# Patient Record
Sex: FEMALE | Race: WHITE | Employment: OTHER | URBAN - METROPOLITAN AREA
[De-identification: names, ages, dates, MRNs, and addresses within clinical notes are randomized per-mention and may not be internally consistent; named-entity substitution may affect disease eponyms.]

---

## 2021-08-05 ENCOUNTER — HOSPITAL ENCOUNTER (INPATIENT)
Age: 26
LOS: 2 days | Discharge: HOME | DRG: 392 | End: 2021-08-09
Attending: INTERNAL MEDICINE | Admitting: HOSPITALIST
Payer: OTHER GOVERNMENT

## 2021-08-05 ENCOUNTER — APPOINTMENT (OUTPATIENT)
Dept: GENERAL RADIOLOGY | Age: 26
DRG: 392 | End: 2021-08-05
Attending: PHYSICIAN ASSISTANT
Payer: OTHER GOVERNMENT

## 2021-08-05 DIAGNOSIS — E86.0 DEHYDRATION: ICD-10-CM

## 2021-08-05 DIAGNOSIS — R11.2 INTRACTABLE NAUSEA AND VOMITING: ICD-10-CM

## 2021-08-05 DIAGNOSIS — K52.9 GASTROENTERITIS: Primary | ICD-10-CM

## 2021-08-05 PROBLEM — E87.6 HYPOKALEMIA: Status: ACTIVE | Noted: 2021-08-05

## 2021-08-05 LAB
ALBUMIN SERPL-MCNC: 4.6 G/DL (ref 3.5–5)
ALBUMIN/GLOB SERPL: 1.2 {RATIO} (ref 1.1–2.2)
ALP SERPL-CCNC: 64 U/L (ref 45–117)
ALT SERPL-CCNC: 27 U/L (ref 12–78)
ANION GAP SERPL CALC-SCNC: 10 MMOL/L (ref 5–15)
APPEARANCE UR: ABNORMAL
AST SERPL W P-5'-P-CCNC: 9 U/L (ref 15–37)
BACTERIA URNS QL MICRO: NEGATIVE /HPF
BASOPHILS # BLD: 0 K/UL (ref 0–0.1)
BASOPHILS NFR BLD: 0 % (ref 0–1)
BILIRUB SERPL-MCNC: 1 MG/DL (ref 0.2–1)
BILIRUB UR QL: NEGATIVE
BUN SERPL-MCNC: 10 MG/DL (ref 6–20)
BUN/CREAT SERPL: 11 (ref 12–20)
CA-I BLD-MCNC: 9.6 MG/DL (ref 8.5–10.1)
CHLORIDE SERPL-SCNC: 102 MMOL/L (ref 97–108)
CO2 SERPL-SCNC: 24 MMOL/L (ref 21–32)
COLOR UR: ABNORMAL
CREAT SERPL-MCNC: 0.9 MG/DL (ref 0.55–1.02)
DIFFERENTIAL METHOD BLD: ABNORMAL
EOSINOPHIL # BLD: 0 K/UL (ref 0–0.4)
EOSINOPHIL NFR BLD: 0 % (ref 0–7)
ERYTHROCYTE [DISTWIDTH] IN BLOOD BY AUTOMATED COUNT: 11.9 % (ref 11.5–14.5)
GLOBULIN SER CALC-MCNC: 4 G/DL (ref 2–4)
GLUCOSE SERPL-MCNC: 92 MG/DL (ref 65–100)
GLUCOSE UR STRIP.AUTO-MCNC: NEGATIVE MG/DL
HCG SERPL QL: NEGATIVE
HCT VFR BLD AUTO: 47.4 % (ref 35–47)
HGB BLD-MCNC: 16 G/DL (ref 11.5–16)
HGB UR QL STRIP: ABNORMAL
IMM GRANULOCYTES # BLD AUTO: 0 K/UL (ref 0–0.04)
IMM GRANULOCYTES NFR BLD AUTO: 0 % (ref 0–0.5)
KETONES UR QL STRIP.AUTO: 80 MG/DL
LEUKOCYTE ESTERASE UR QL STRIP.AUTO: NEGATIVE
LIPASE SERPL-CCNC: 76 U/L (ref 73–393)
LYMPHOCYTES # BLD: 1.3 K/UL (ref 0.8–3.5)
LYMPHOCYTES NFR BLD: 19 % (ref 12–49)
MAGNESIUM SERPL-MCNC: 2.2 MG/DL (ref 1.6–2.4)
MCH RBC QN AUTO: 30.8 PG (ref 26–34)
MCHC RBC AUTO-ENTMCNC: 33.8 G/DL (ref 30–36.5)
MCV RBC AUTO: 91.2 FL (ref 80–99)
MONOCYTES # BLD: 0.3 K/UL (ref 0–1)
MONOCYTES NFR BLD: 5 % (ref 5–13)
MUCOUS THREADS URNS QL MICRO: ABNORMAL /LPF
NEUTS SEG # BLD: 5.1 K/UL (ref 1.8–8)
NEUTS SEG NFR BLD: 76 % (ref 32–75)
NITRITE UR QL STRIP.AUTO: NEGATIVE
NRBC # BLD: 0 K/UL (ref 0–0.01)
NRBC BLD-RTO: 0 PER 100 WBC
PH UR STRIP: 6 [PH] (ref 5–8)
PLATELET # BLD AUTO: 301 K/UL (ref 150–400)
PMV BLD AUTO: 10.2 FL (ref 8.9–12.9)
POTASSIUM SERPL-SCNC: 3.4 MMOL/L (ref 3.5–5.1)
PROT SERPL-MCNC: 8.6 G/DL (ref 6.4–8.2)
PROT UR STRIP-MCNC: NEGATIVE MG/DL
RBC # BLD AUTO: 5.2 M/UL (ref 3.8–5.2)
RBC #/AREA URNS HPF: ABNORMAL /HPF (ref 0–5)
SODIUM SERPL-SCNC: 136 MMOL/L (ref 136–145)
SP GR UR REFRACTOMETRY: 1.02 (ref 1–1.03)
UA: UC IF INDICATED,UAUC: ABNORMAL
UROBILINOGEN UR QL STRIP.AUTO: 4 EU/DL (ref 0.1–1)
WBC # BLD AUTO: 6.7 K/UL (ref 3.6–11)
WBC URNS QL MICRO: ABNORMAL /HPF (ref 0–4)

## 2021-08-05 PROCEDURE — 74011250637 HC RX REV CODE- 250/637: Performed by: INTERNAL MEDICINE

## 2021-08-05 PROCEDURE — 83735 ASSAY OF MAGNESIUM: CPT

## 2021-08-05 PROCEDURE — 80053 COMPREHEN METABOLIC PANEL: CPT

## 2021-08-05 PROCEDURE — 74011250636 HC RX REV CODE- 250/636: Performed by: PHYSICIAN ASSISTANT

## 2021-08-05 PROCEDURE — 96374 THER/PROPH/DIAG INJ IV PUSH: CPT

## 2021-08-05 PROCEDURE — 96376 TX/PRO/DX INJ SAME DRUG ADON: CPT

## 2021-08-05 PROCEDURE — 85025 COMPLETE CBC W/AUTO DIFF WBC: CPT

## 2021-08-05 PROCEDURE — 83690 ASSAY OF LIPASE: CPT

## 2021-08-05 PROCEDURE — 81001 URINALYSIS AUTO W/SCOPE: CPT

## 2021-08-05 PROCEDURE — 99218 HC RM OBSERVATION: CPT

## 2021-08-05 PROCEDURE — 74011250637 HC RX REV CODE- 250/637: Performed by: PHYSICIAN ASSISTANT

## 2021-08-05 PROCEDURE — 74011250636 HC RX REV CODE- 250/636: Performed by: INTERNAL MEDICINE

## 2021-08-05 PROCEDURE — 74011000258 HC RX REV CODE- 258: Performed by: INTERNAL MEDICINE

## 2021-08-05 PROCEDURE — 84703 CHORIONIC GONADOTROPIN ASSAY: CPT

## 2021-08-05 PROCEDURE — 96375 TX/PRO/DX INJ NEW DRUG ADDON: CPT

## 2021-08-05 PROCEDURE — 74018 RADEX ABDOMEN 1 VIEW: CPT

## 2021-08-05 PROCEDURE — 74011000258 HC RX REV CODE- 258: Performed by: PHYSICIAN ASSISTANT

## 2021-08-05 PROCEDURE — 99284 EMERGENCY DEPT VISIT MOD MDM: CPT

## 2021-08-05 PROCEDURE — 36415 COLL VENOUS BLD VENIPUNCTURE: CPT

## 2021-08-05 RX ORDER — POTASSIUM CHLORIDE 750 MG/1
20 TABLET, FILM COATED, EXTENDED RELEASE ORAL
Status: COMPLETED | OUTPATIENT
Start: 2021-08-05 | End: 2021-08-05

## 2021-08-05 RX ORDER — SODIUM CHLORIDE 0.9 % (FLUSH) 0.9 %
5-40 SYRINGE (ML) INJECTION EVERY 8 HOURS
Status: DISCONTINUED | OUTPATIENT
Start: 2021-08-05 | End: 2021-08-09 | Stop reason: HOSPADM

## 2021-08-05 RX ORDER — METOCLOPRAMIDE HYDROCHLORIDE 5 MG/ML
5 INJECTION INTRAMUSCULAR; INTRAVENOUS EVERY 6 HOURS
Status: DISCONTINUED | OUTPATIENT
Start: 2021-08-05 | End: 2021-08-09 | Stop reason: HOSPADM

## 2021-08-05 RX ORDER — MORPHINE SULFATE 2 MG/ML
2 INJECTION, SOLUTION INTRAMUSCULAR; INTRAVENOUS ONCE
Status: COMPLETED | OUTPATIENT
Start: 2021-08-05 | End: 2021-08-05

## 2021-08-05 RX ORDER — HALOPERIDOL 5 MG/ML
2 INJECTION INTRAMUSCULAR ONCE
Status: COMPLETED | OUTPATIENT
Start: 2021-08-05 | End: 2021-08-05

## 2021-08-05 RX ORDER — KETOROLAC TROMETHAMINE 15 MG/ML
15 INJECTION, SOLUTION INTRAMUSCULAR; INTRAVENOUS
Status: DISCONTINUED | OUTPATIENT
Start: 2021-08-05 | End: 2021-08-06

## 2021-08-05 RX ORDER — ENOXAPARIN SODIUM 100 MG/ML
30 INJECTION SUBCUTANEOUS DAILY
Status: DISCONTINUED | OUTPATIENT
Start: 2021-08-06 | End: 2021-08-05

## 2021-08-05 RX ORDER — DICYCLOMINE HYDROCHLORIDE 10 MG/1
10 CAPSULE ORAL 4 TIMES DAILY
Qty: 28 CAPSULE | Refills: 0 | Status: SHIPPED | OUTPATIENT
Start: 2021-08-05 | End: 2021-08-09

## 2021-08-05 RX ORDER — ONDANSETRON 4 MG/1
4 TABLET, ORALLY DISINTEGRATING ORAL
Status: DISCONTINUED | OUTPATIENT
Start: 2021-08-05 | End: 2021-08-09 | Stop reason: HOSPADM

## 2021-08-05 RX ORDER — DEXAMETHASONE SODIUM PHOSPHATE 4 MG/ML
6 INJECTION, SOLUTION INTRA-ARTICULAR; INTRALESIONAL; INTRAMUSCULAR; INTRAVENOUS; SOFT TISSUE ONCE
Status: COMPLETED | OUTPATIENT
Start: 2021-08-05 | End: 2021-08-05

## 2021-08-05 RX ORDER — ONDANSETRON 2 MG/ML
4 INJECTION INTRAMUSCULAR; INTRAVENOUS
Status: COMPLETED | OUTPATIENT
Start: 2021-08-05 | End: 2021-08-05

## 2021-08-05 RX ORDER — POTASSIUM CHLORIDE 750 MG/1
20 TABLET, FILM COATED, EXTENDED RELEASE ORAL
Status: ACTIVE | OUTPATIENT
Start: 2021-08-05 | End: 2021-08-06

## 2021-08-05 RX ORDER — SODIUM CHLORIDE 0.9 % (FLUSH) 0.9 %
5-40 SYRINGE (ML) INJECTION AS NEEDED
Status: DISCONTINUED | OUTPATIENT
Start: 2021-08-05 | End: 2021-08-09 | Stop reason: HOSPADM

## 2021-08-05 RX ORDER — KETOROLAC TROMETHAMINE 15 MG/ML
15 INJECTION, SOLUTION INTRAMUSCULAR; INTRAVENOUS ONCE
Status: COMPLETED | OUTPATIENT
Start: 2021-08-05 | End: 2021-08-05

## 2021-08-05 RX ORDER — HYDRALAZINE HYDROCHLORIDE 20 MG/ML
10 INJECTION INTRAMUSCULAR; INTRAVENOUS
Status: DISCONTINUED | OUTPATIENT
Start: 2021-08-05 | End: 2021-08-09 | Stop reason: HOSPADM

## 2021-08-05 RX ORDER — SODIUM CHLORIDE AND POTASSIUM CHLORIDE .9; .15 G/100ML; G/100ML
SOLUTION INTRAVENOUS CONTINUOUS
Status: DISCONTINUED | OUTPATIENT
Start: 2021-08-05 | End: 2021-08-09 | Stop reason: HOSPADM

## 2021-08-05 RX ORDER — ACETAMINOPHEN 325 MG/1
650 TABLET ORAL
Status: DISCONTINUED | OUTPATIENT
Start: 2021-08-05 | End: 2021-08-09 | Stop reason: HOSPADM

## 2021-08-05 RX ORDER — POLYETHYLENE GLYCOL 3350 17 G/17G
17 POWDER, FOR SOLUTION ORAL DAILY PRN
Status: DISCONTINUED | OUTPATIENT
Start: 2021-08-05 | End: 2021-08-07

## 2021-08-05 RX ORDER — ONDANSETRON 2 MG/ML
4 INJECTION INTRAMUSCULAR; INTRAVENOUS
Status: DISCONTINUED | OUTPATIENT
Start: 2021-08-05 | End: 2021-08-09 | Stop reason: HOSPADM

## 2021-08-05 RX ORDER — PROMETHAZINE HYDROCHLORIDE 25 MG/1
25 SUPPOSITORY RECTAL
Qty: 12 SUPPOSITORY | Refills: 0 | Status: SHIPPED | OUTPATIENT
Start: 2021-08-05 | End: 2021-08-09

## 2021-08-05 RX ORDER — ENOXAPARIN SODIUM 100 MG/ML
40 INJECTION SUBCUTANEOUS DAILY
Status: DISCONTINUED | OUTPATIENT
Start: 2021-08-06 | End: 2021-08-09 | Stop reason: HOSPADM

## 2021-08-05 RX ORDER — ENOXAPARIN SODIUM 100 MG/ML
40 INJECTION SUBCUTANEOUS DAILY
Status: DISCONTINUED | OUTPATIENT
Start: 2021-08-06 | End: 2021-08-05

## 2021-08-05 RX ORDER — ACETAMINOPHEN 650 MG/1
650 SUPPOSITORY RECTAL
Status: DISCONTINUED | OUTPATIENT
Start: 2021-08-05 | End: 2021-08-09 | Stop reason: HOSPADM

## 2021-08-05 RX ADMIN — Medication 10 ML: at 23:33

## 2021-08-05 RX ADMIN — POTASSIUM CHLORIDE 20 MEQ: 750 TABLET, FILM COATED, EXTENDED RELEASE ORAL at 12:16

## 2021-08-05 RX ADMIN — METOCLOPRAMIDE HYDROCHLORIDE 5 MG: 5 INJECTION INTRAMUSCULAR; INTRAVENOUS at 19:43

## 2021-08-05 RX ADMIN — PROMETHAZINE HYDROCHLORIDE 12.5 MG: 25 INJECTION INTRAMUSCULAR; INTRAVENOUS at 13:09

## 2021-08-05 RX ADMIN — DEXAMETHASONE SODIUM PHOSPHATE 6 MG: 4 INJECTION, SOLUTION INTRA-ARTICULAR; INTRALESIONAL; INTRAMUSCULAR; INTRAVENOUS; SOFT TISSUE at 19:43

## 2021-08-05 RX ADMIN — ONDANSETRON 4 MG: 2 INJECTION INTRAMUSCULAR; INTRAVENOUS at 09:21

## 2021-08-05 RX ADMIN — ACETAMINOPHEN 650 MG: 325 TABLET ORAL at 21:51

## 2021-08-05 RX ADMIN — SODIUM CHLORIDE 1000 ML: 9 INJECTION, SOLUTION INTRAVENOUS at 09:22

## 2021-08-05 RX ADMIN — PROMETHAZINE HYDROCHLORIDE 12.5 MG: 25 INJECTION INTRAMUSCULAR; INTRAVENOUS at 23:33

## 2021-08-05 RX ADMIN — POTASSIUM CHLORIDE AND SODIUM CHLORIDE: 900; 150 INJECTION, SOLUTION INTRAVENOUS at 19:43

## 2021-08-05 RX ADMIN — MORPHINE SULFATE 2 MG: 2 INJECTION, SOLUTION INTRAMUSCULAR; INTRAVENOUS at 10:06

## 2021-08-05 RX ADMIN — HALOPERIDOL LACTATE 2 MG: 5 INJECTION, SOLUTION INTRAMUSCULAR at 14:18

## 2021-08-05 RX ADMIN — KETOROLAC TROMETHAMINE 15 MG: 15 INJECTION, SOLUTION INTRAMUSCULAR; INTRAVENOUS at 12:51

## 2021-08-05 RX ADMIN — FAMOTIDINE 20 MG: 10 INJECTION, SOLUTION INTRAVENOUS at 21:51

## 2021-08-05 NOTE — ED PROVIDER NOTES
EMERGENCY DEPARTMENT HISTORY AND PHYSICAL EXAM      Date: 8/5/2021  Patient Name: Jarett Haro    History of Presenting Illness     Chief Complaint   Patient presents with    Vomiting       History Provided By: Patient    HPI: Jarett Haro, 31 y.o. female with a past medical history significant No significant past medical history presents to the ED with cc of nausea and vomiting onset 5 days ago, intermittently improved however she has been unable to keep solid foods down for the last few days. She has been seen at 2 other facilities since her symptoms started. She has been prescribed Carafate, Zofran, Phenergan, and Pepcid. She states that the nausea medications are not helping and she has been unable to keep the other medications down secondary to vomiting. She reports 2 days ago she had a normal CT scan of her abdomen at [facility] emergency department. She thinks that her symptoms started after eating spoiled food at a family gathering. Associated symptoms include slight abdominal pain. She specifically denies fever, chills, body aches, chest pain, shortness of breath, headache, history of migraines, dysuria, hematuria, diarrhea, blood in stool, recent travel, recent antibiotic use, any chance of pregnancy. She denies a history of abdominal surgeries. There are no other complaints, changes, or physical findings at this time. PCP: None    No current facility-administered medications on file prior to encounter. No current outpatient medications on file prior to encounter. Past History     Past Medical History:  History reviewed. No pertinent past medical history. Past Surgical History:  History reviewed. No pertinent surgical history. Family History:  History reviewed. No pertinent family history.     Social History:  Social History     Tobacco Use    Smoking status: Never Smoker    Smokeless tobacco: Never Used   Substance Use Topics    Alcohol use: Not on file    Drug use: Not on file       Allergies: Allergies   Allergen Reactions    Penicillins Unknown (comments)         Review of Systems   Review of Systems   Constitutional: Negative for activity change, chills and fever. HENT: Negative for congestion, ear pain, rhinorrhea, sneezing and sore throat. Eyes: Negative for pain and visual disturbance. Respiratory: Negative for cough and shortness of breath. Cardiovascular: Negative for chest pain. Gastrointestinal: Positive for abdominal pain, nausea and vomiting. Negative for blood in stool, constipation and diarrhea. Genitourinary: Negative for dysuria, hematuria and vaginal discharge. Musculoskeletal: Negative for gait problem. Skin: Negative for rash. Neurological: Negative for speech difficulty, weakness and headaches. Psychiatric/Behavioral: The patient is not nervous/anxious. All other systems reviewed and are negative. Physical Exam   Physical Exam  Vitals and nursing note reviewed. Constitutional:       General: She is not in acute distress. Appearance: Normal appearance. She is ill-appearing. She is not toxic-appearing. HENT:      Head: Normocephalic and atraumatic. Nose: Nose normal.      Mouth/Throat:      Mouth: Mucous membranes are moist.      Pharynx: Oropharynx is clear. Eyes:      Extraocular Movements: Extraocular movements intact. Conjunctiva/sclera: Conjunctivae normal.      Pupils: Pupils are equal, round, and reactive to light. Cardiovascular:      Rate and Rhythm: Normal rate. Pulses: Normal pulses. Heart sounds: Normal heart sounds. Pulmonary:      Effort: Pulmonary effort is normal. No respiratory distress. Breath sounds: Normal breath sounds. Abdominal:      General: Abdomen is flat. Bowel sounds are normal.      Palpations: Abdomen is soft. Tenderness: There is abdominal tenderness (milld) in the left upper quadrant.  There is no right CVA tenderness, left CVA tenderness, guarding or rebound. Hernia: No hernia is present. Musculoskeletal:         General: No deformity or signs of injury. Normal range of motion. Cervical back: Normal range of motion. Skin:     General: Skin is warm and dry. Capillary Refill: Capillary refill takes less than 2 seconds. Findings: No rash. Neurological:      General: No focal deficit present. Mental Status: She is alert and oriented to person, place, and time. Cranial Nerves: No cranial nerve deficit. Psychiatric:         Mood and Affect: Mood normal.         Diagnostic Study Results     Labs -     Recent Results (from the past 48 hour(s))   LIPASE    Collection Time: 08/05/21  9:05 AM   Result Value Ref Range    Lipase 76 73 - 025 U/L   METABOLIC PANEL, COMPREHENSIVE    Collection Time: 08/05/21  9:05 AM   Result Value Ref Range    Sodium 136 136 - 145 mmol/L    Potassium 3.4 (L) 3.5 - 5.1 mmol/L    Chloride 102 97 - 108 mmol/L    CO2 24 21 - 32 mmol/L    Anion gap 10 5 - 15 mmol/L    Glucose 92 65 - 100 mg/dL    BUN 10 6 - 20 mg/dL    Creatinine 0.90 0.55 - 1.02 mg/dL    BUN/Creatinine ratio 11 (L) 12 - 20      GFR est AA >60 >60 ml/min/1.73m2    GFR est non-AA >60 >60 ml/min/1.73m2    Calcium 9.6 8.5 - 10.1 mg/dL    Bilirubin, total 1.0 0.2 - 1.0 mg/dL    AST (SGOT) 9 (L) 15 - 37 U/L    ALT (SGPT) 27 12 - 78 U/L    Alk.  phosphatase 64 45 - 117 U/L    Protein, total 8.6 (H) 6.4 - 8.2 g/dL    Albumin 4.6 3.5 - 5.0 g/dL    Globulin 4.0 2.0 - 4.0 g/dL    A-G Ratio 1.2 1.1 - 2.2     CBC WITH AUTOMATED DIFF    Collection Time: 08/05/21  9:05 AM   Result Value Ref Range    WBC 6.7 3.6 - 11.0 K/uL    RBC 5.20 3.80 - 5.20 M/uL    HGB 16.0 11.5 - 16.0 g/dL    HCT 47.4 (H) 35.0 - 47.0 %    MCV 91.2 80.0 - 99.0 FL    MCH 30.8 26.0 - 34.0 PG    MCHC 33.8 30.0 - 36.5 g/dL    RDW 11.9 11.5 - 14.5 %    PLATELET 877 218 - 938 K/uL    MPV 10.2 8.9 - 12.9 FL    NRBC 0.0 0.0  WBC    ABSOLUTE NRBC 0.00 0.00 - 0.01 K/uL    NEUTROPHILS 76 (H) 32 - 75 %    LYMPHOCYTES 19 12 - 49 %    MONOCYTES 5 5 - 13 %    EOSINOPHILS 0 0 - 7 %    BASOPHILS 0 0 - 1 %    IMMATURE GRANULOCYTES 0 0 - 0.5 %    ABS. NEUTROPHILS 5.1 1.8 - 8.0 K/UL    ABS. LYMPHOCYTES 1.3 0.8 - 3.5 K/UL    ABS. MONOCYTES 0.3 0.0 - 1.0 K/UL    ABS. EOSINOPHILS 0.0 0.0 - 0.4 K/UL    ABS. BASOPHILS 0.0 0.0 - 0.1 K/UL    ABS. IMM. GRANS. 0.0 0.00 - 0.04 K/UL    DF AUTOMATED     HCG QL SERUM    Collection Time: 08/05/21  9:05 AM   Result Value Ref Range    HCG, Ql. Negative Negative     MAGNESIUM    Collection Time: 08/05/21  9:05 AM   Result Value Ref Range    Magnesium 2.2 1.6 - 2.4 mg/dL   URINALYSIS W/ REFLEX CULTURE    Collection Time: 08/05/21 10:40 AM    Specimen: Urine   Result Value Ref Range    Color Yellow/Straw      Appearance Turbid (A) Clear      Specific gravity 1.024 1.003 - 1.030      pH (UA) 6.0 5.0 - 8.0      Protein Negative Negative mg/dL    Glucose Negative Negative mg/dL    Ketone 80 (A) Negative mg/dL    Bilirubin Negative Negative      Blood Moderate (A) Negative      Urobilinogen 4.0 (H) 0.1 - 1.0 EU/dL    Nitrites Negative Negative      Leukocyte Esterase Negative Negative      UA:UC IF INDICATED Culture not indicated by UA result Culture not indicated by UA result      WBC 0-4 0 - 4 /hpf    RBC 0-5 0 - 5 /hpf    Bacteria Negative Negative /hpf    Mucus 1+ /lpf       Radiologic Studies -   Results from Hospital Encounter encounter on 08/05/21    XR ABD (KUB)    Narrative  1 view    Clear lung bases. Normal gas pattern. Linear foreign bodies project over the low  pelvis, location not identified in single plane. No free air     CT Results  (Last 48 hours)    None          Medical Decision Making   I am the first provider for this patient. I reviewed the vital signs, available nursing notes, past medical history, past surgical history, family history and social history. Vital Signs-Reviewed the patient's vital signs.   Patient Vitals for the past 12 hrs:   Temp Pulse Resp BP SpO2   08/05/21 1420 -- 67 12 (!) 151/94 100 %   08/05/21 1253 -- 61 12 (!) 145/99 99 %   08/05/21 1217 -- 78 19 109/62 --   08/05/21 1006 -- 64 17 (!) 149/94 100 %   08/05/21 0925 -- 62 12 (!) 131/93 100 %   08/05/21 0825 98.6 °F (37 °C) 66 18 (!) 132/96 100 %       Records Reviewed: Nursing Notes and Old Medical Records    Provider Notes (Medical Decision Making):     MDM  Number of Diagnoses or Management Options  Dehydration  Gastroenteritis  Intractable nausea and vomiting  Diagnosis management comments: 60-year-old female with no significant past medical history is presenting with ongoing nausea and vomiting for several days and associated abdominal pain. She is tender in the left upper quadrant. Will evaluate with basic labs, lipase, urine. She had a CT scan done 2 days ago, will call [facility] for that report. She is been given IV fluids and IV Zofran for her symptoms. Will reevaluate and p.o. challenge. I suspect gastroenteritis versus gastritis versus hypokalemia. Spoke with [facility] emergency department who confirmed CT scan abdomen pelvis findings yesterday of no acute process, some mild bladder distention, no appendicitis, no bowel obstruction, no other concerning findings. Still unsure of patient's intractable nausea and vomiting as well as abdominal pain. Suspect gastroenteritis. She has been given IV fluids, Zofran, Phenergan, and Haldol and she continues to complain of nausea and vomit. She will be admitted for observation. Amount and/or Complexity of Data Reviewed  Clinical lab tests: ordered and reviewed  Tests in the radiology section of CPT®: ordered and reviewed        ED Course:   Initial assessment performed. The patients presenting problems have been discussed, and they are in agreement with the care plan formulated and outlined with them. I have encouraged them to ask questions as they arise throughout their visit.     9:32 AM  Progress Note:  Patient was re-evaluated at bedside. Patient is now complaining of 7 out of 10 abdominal pain. Will order morphine. 2:00 PM  Progress Note:  Patient was re-evaluated at bedside. Patient still complaining of pain and vomiting after Zofran and Phenergan. Will give Haldol. 5:55 PM  Progress Note:  Patient was re-evaluated at bedside. Continues with intractable nausea after 3 IV medications and fluids. She is wishing to stay in the hospital for further observation. She is afraid to go home as she will start vomiting again. Consult Note:  6:06 PM  Jessie Crawford PA-C spoke with Dr. Christina Nicholas,  Specialty: Internal Medicine  Discussed pt's hx, disposition, and available diagnostic and imaging results. Reviewed care plans. Dr. Christina Nicholas will evaluate for admission. Admit Note:  6:06 PM  Pt is being admitted by Dr. Christina Nicholas. The results of their tests and reason(s) for their admission have been discussed with pt and/or available family. They convey agreement and understanding for the need to be admitted and for admission diagnosis. PROCEDURES    Procedures       Disposition     Disposition: Admitted to Observation Unit the case was discussed with the admitting physician Dr. Christina Nicholas    Admitted       Diagnosis     Clinical Impression:   1. Gastroenteritis    2. Non-intractable vomiting with nausea, unspecified vomiting type    3.  Dehydration

## 2021-08-05 NOTE — ED TRIAGE NOTES
Patient says since Saturday she has been vomiting. Has been seen at 2 other ERs no findings.  Still vomiting

## 2021-08-05 NOTE — ED NOTES
Bedside shift change report given to [address]  (oncoming nurse) by Michael kang RN  (offgoing nurse). Report included the following information SBAR and ED Summary.

## 2021-08-05 NOTE — H&P
History & Physical     Primary Care Provider: None  Source of Information: Patient          History of Presenting Illness:   Anne George is a 31 y.o. female who presents with no significant past medical history presents with intractable nausea and vomiting. She states has been going on since last Sunday, approximately 5 days ago. She feels that while at a family gathering and she ate some food that had been out all day. There has been no hematemesis. She has had some mid and upper abdominal discomfort. She denies diarrhea. She has been in the ER all day. She received fluid boluses as were as alternating antiemetics without relief and is unable to maintain oral intake therefore she is referred to us for admission.          Review of Systems:  Review of Systems   Constitutional: Positive for malaise/fatigue. Negative for chills and fever. HENT: Negative. Eyes: Negative. Respiratory: Negative. Cardiovascular: Negative. Gastrointestinal: Positive for abdominal pain, heartburn, nausea and vomiting. Negative for blood in stool, constipation, diarrhea and melena. Genitourinary: Negative. She is currently on her menses   Musculoskeletal: Negative. Skin: Negative. Neurological: Negative. Endo/Heme/Allergies: Negative. Psychiatric/Behavioral: Negative.             History reviewed. No pertinent past medical history. Denies prior medical history  History reviewed. No pertinent surgical history. Denies any prior surgical history          Prior to Admission medications    Medication Sig Start Date End Date Taking? Authorizing Provider   dicyclomine (BENTYL) 10 mg capsule Take 1 Capsule by mouth four (4) times daily for 7 days. 8/5/21 8/12/21 Yes Tania Preston PA-C   promethazine (PHENERGAN) 25 mg suppository Insert 1 Suppository into rectum every six (6) hours as needed for Nausea for up to 7 days.  8/5/21 8/12/21 Yes Elliot Palencia PA-C   She is not currently on any medications at home       Allergies   Allergen Reactions    Penicillins Unknown (comments)      History reviewed. No pertinent family history.      SOCIAL HISTORY:  Patient resides:  Independently x   Assisted Living     SNF     With family care         Smoking history:   None x   Former     Chronic        Alcohol history:   None x   Social     Chronic        Ambulates:   Independently x   w/cane     w/walker     w/wc     CODE STATUS:  DNR     Full x   Other        Objective:      Physical Exam:      Visit Vitals  BP (!) 139/91 (BP 1 Location: Left upper arm, BP Patient Position: Supine; At rest)   Pulse 69   Temp 100.4 °F (38 °C)   Resp 18   Ht 5' 7\" (1.702 m)   Wt 63.5 kg (139 lb 15.9 oz)   SpO2 99%   BMI 21.93 kg/m²      O2 Device: None (Room air)     General:  Alert, cooperative, she looks weak, not distressed   Head:  Normocephalic, without obvious abnormality, atraumatic. Eyes:  Conjunctivae/corneas clear. EOMs intact. Throat:  Oral mucosa is moist   Neck: Supple, symmetrical, trachea midline, no adenopathy, thyroid: no enlargement/tenderness/nodules, no carotid bruit and no JVD. Back:   Symmetric, no curvature. ROM normal. No CVA tenderness. Lungs:   Clear to auscultation bilaterally. Chest wall:  No tenderness or deformity. Heart:  Regular rate and rhythm, S1, S2 normal, no murmur, click, rub or gallop. Abdomen:   Soft, epigastric tenderness without rebound or guarding. Bowel sounds normal. No masses,  No organomegaly. Extremities: Extremities normal, atraumatic, no cyanosis or edema. Pulses: 2+ and symmetric all extremities. Skin: Skin color, texture, turgor normal. No rashes or lesions   Neurologic: CNII-XII intact.  No motor or sensory deficits.                Data Review:      Recent Days:      Recent Labs     08/05/21  0905   WBC 6.7   HGB 16.0   HCT 47.4*             Recent Labs     08/05/21  0905      K 3.4*      CO2 24   GLU 92   BUN 10   CREA 0.90   CA 9.6   MG 2.2   ALB 4.6   TBILI 1.0   ALT 27      No results for input(s): PH, PCO2, PO2, HCO3, FIO2 in the last 72 hours.     24 Hour Results:  Recent Results         Recent Results (from the past 24 hour(s))   LIPASE     Collection Time: 08/05/21  9:05 AM   Result Value Ref Range     Lipase 76 73 - 443 U/L   METABOLIC PANEL, COMPREHENSIVE     Collection Time: 08/05/21  9:05 AM   Result Value Ref Range     Sodium 136 136 - 145 mmol/L     Potassium 3.4 (L) 3.5 - 5.1 mmol/L     Chloride 102 97 - 108 mmol/L     CO2 24 21 - 32 mmol/L     Anion gap 10 5 - 15 mmol/L     Glucose 92 65 - 100 mg/dL     BUN 10 6 - 20 mg/dL     Creatinine 0.90 0.55 - 1.02 mg/dL     BUN/Creatinine ratio 11 (L) 12 - 20       GFR est AA >60 >60 ml/min/1.73m2     GFR est non-AA >60 >60 ml/min/1.73m2     Calcium 9.6 8.5 - 10.1 mg/dL     Bilirubin, total 1.0 0.2 - 1.0 mg/dL     AST (SGOT) 9 (L) 15 - 37 U/L     ALT (SGPT) 27 12 - 78 U/L     Alk. phosphatase 64 45 - 117 U/L     Protein, total 8.6 (H) 6.4 - 8.2 g/dL     Albumin 4.6 3.5 - 5.0 g/dL     Globulin 4.0 2.0 - 4.0 g/dL     A-G Ratio 1.2 1.1 - 2.2     CBC WITH AUTOMATED DIFF     Collection Time: 08/05/21  9:05 AM   Result Value Ref Range     WBC 6.7 3.6 - 11.0 K/uL     RBC 5.20 3.80 - 5.20 M/uL     HGB 16.0 11.5 - 16.0 g/dL     HCT 47.4 (H) 35.0 - 47.0 %     MCV 91.2 80.0 - 99.0 FL     MCH 30.8 26.0 - 34.0 PG     MCHC 33.8 30.0 - 36.5 g/dL     RDW 11.9 11.5 - 14.5 %     PLATELET 271 324 - 600 K/uL     MPV 10.2 8.9 - 12.9 FL     NRBC 0.0 0.0  WBC     ABSOLUTE NRBC 0.00 0.00 - 0.01 K/uL     NEUTROPHILS 76 (H) 32 - 75 %     LYMPHOCYTES 19 12 - 49 %     MONOCYTES 5 5 - 13 %     EOSINOPHILS 0 0 - 7 %     BASOPHILS 0 0 - 1 %     IMMATURE GRANULOCYTES 0 0 - 0.5 %     ABS. NEUTROPHILS 5.1 1.8 - 8.0 K/UL     ABS. LYMPHOCYTES 1.3 0.8 - 3.5 K/UL     ABS. MONOCYTES 0.3 0.0 - 1.0 K/UL     ABS. EOSINOPHILS 0.0 0.0 - 0.4 K/UL     ABS. BASOPHILS 0.0 0.0 - 0.1 K/UL     ABS. IMM.  GRANS. 0.0 0.00 - 0.04 K/UL     DF AUTOMATED     HCG QL SERUM     Collection Time: 08/05/21  9:05 AM   Result Value Ref Range     HCG, Ql. Negative Negative     MAGNESIUM     Collection Time: 08/05/21  9:05 AM   Result Value Ref Range     Magnesium 2.2 1.6 - 2.4 mg/dL   URINALYSIS W/ REFLEX CULTURE     Collection Time: 08/05/21 10:40 AM     Specimen: Urine   Result Value Ref Range     Color Yellow/Straw       Appearance Turbid (A) Clear       Specific gravity 1.024 1.003 - 1.030       pH (UA) 6.0 5.0 - 8.0       Protein Negative Negative mg/dL     Glucose Negative Negative mg/dL     Ketone 80 (A) Negative mg/dL     Bilirubin Negative Negative       Blood Moderate (A) Negative       Urobilinogen 4.0 (H) 0.1 - 1.0 EU/dL     Nitrites Negative Negative       Leukocyte Esterase Negative Negative       UA:UC IF INDICATED Culture not indicated by UA result Culture not indicated by UA result       WBC 0-4 0 - 4 /hpf     RBC 0-5 0 - 5 /hpf     Bacteria Negative Negative /hpf     Mucus 1+ /lpf               Imaging:      Assessment:      Active Problems:    Hypokalemia (8/5/2021)       Intractable nausea and vomiting (8/5/2021)         65-year-old female with no prior medical or surgical history presents with intractable nauseaand vomiting going on now for about 5 days. She has been able to maintain some oral intake,  althogh does appear dehydrated  and renal function is preserved. She was here in the emergency room for approximately 9 hours receiving fluid boluses and alternating antiemetics without relief. Admitting secondary to intractable nausea and vomiting and hypokalemia likely secondary to food poisoning. Epigastric tenderness is likely secondary to intractable nausea and vomiting and likely some underlying gastritis.  No diarrhea likely not staph.        Plan:      -Observation admission  -Judicious hydration  -Reglan and will continue with alternating Phenergan and ondansetron  -Replace and follow electrolytes  -Reglan 20 mg IV twice daily  -Clear liquid diet and will advance as tolerated  -dexamethasone 6mg x 1  Gi cx if no better & consider ct abd, KUB.  reviewed.     VTEP: Lovenox  GIP: Pepcid  Full CODE STATUS  Disposition: Pending course, observation admission, home in the morning if we can get her to tolerate oral intake.         Signed By: Harrison Rojas MD     August 5, 2021

## 2021-08-06 LAB
ANION GAP SERPL CALC-SCNC: 6 MMOL/L (ref 5–15)
BASOPHILS # BLD: 0 K/UL (ref 0–0.1)
BASOPHILS NFR BLD: 0 % (ref 0–1)
BUN SERPL-MCNC: 11 MG/DL (ref 6–20)
BUN/CREAT SERPL: 16 (ref 12–20)
CA-I BLD-MCNC: 8.4 MG/DL (ref 8.5–10.1)
CHLORIDE SERPL-SCNC: 106 MMOL/L (ref 97–108)
CO2 SERPL-SCNC: 24 MMOL/L (ref 21–32)
CREAT SERPL-MCNC: 0.7 MG/DL (ref 0.55–1.02)
DIFFERENTIAL METHOD BLD: ABNORMAL
EOSINOPHIL # BLD: 0 K/UL (ref 0–0.4)
EOSINOPHIL NFR BLD: 0 % (ref 0–7)
ERYTHROCYTE [DISTWIDTH] IN BLOOD BY AUTOMATED COUNT: 12 % (ref 11.5–14.5)
GLUCOSE SERPL-MCNC: 104 MG/DL (ref 65–100)
HCT VFR BLD AUTO: 40.1 % (ref 35–47)
HGB BLD-MCNC: 13.4 G/DL (ref 11.5–16)
IMM GRANULOCYTES # BLD AUTO: 0 K/UL (ref 0–0.04)
IMM GRANULOCYTES NFR BLD AUTO: 1 % (ref 0–0.5)
LYMPHOCYTES # BLD: 0.6 K/UL (ref 0.8–3.5)
LYMPHOCYTES NFR BLD: 9 % (ref 12–49)
MAGNESIUM SERPL-MCNC: 2.2 MG/DL (ref 1.6–2.4)
MCH RBC QN AUTO: 30.9 PG (ref 26–34)
MCHC RBC AUTO-ENTMCNC: 33.4 G/DL (ref 30–36.5)
MCV RBC AUTO: 92.4 FL (ref 80–99)
MONOCYTES # BLD: 0.1 K/UL (ref 0–1)
MONOCYTES NFR BLD: 1 % (ref 5–13)
NEUTS SEG # BLD: 5.8 K/UL (ref 1.8–8)
NEUTS SEG NFR BLD: 90 % (ref 32–75)
NRBC # BLD: 0 K/UL (ref 0–0.01)
NRBC BLD-RTO: 0 PER 100 WBC
PLATELET # BLD AUTO: 264 K/UL (ref 150–400)
PMV BLD AUTO: 11.1 FL (ref 8.9–12.9)
POTASSIUM SERPL-SCNC: 4.2 MMOL/L (ref 3.5–5.1)
RBC # BLD AUTO: 4.34 M/UL (ref 3.8–5.2)
SODIUM SERPL-SCNC: 136 MMOL/L (ref 136–145)
WBC # BLD AUTO: 6.4 K/UL (ref 3.6–11)

## 2021-08-06 PROCEDURE — 80048 BASIC METABOLIC PNL TOTAL CA: CPT

## 2021-08-06 PROCEDURE — 83735 ASSAY OF MAGNESIUM: CPT

## 2021-08-06 PROCEDURE — 96376 TX/PRO/DX INJ SAME DRUG ADON: CPT

## 2021-08-06 PROCEDURE — 74011250636 HC RX REV CODE- 250/636: Performed by: INTERNAL MEDICINE

## 2021-08-06 PROCEDURE — 36415 COLL VENOUS BLD VENIPUNCTURE: CPT

## 2021-08-06 PROCEDURE — 74011000258 HC RX REV CODE- 258: Performed by: INTERNAL MEDICINE

## 2021-08-06 PROCEDURE — 74011250636 HC RX REV CODE- 250/636: Performed by: HOSPITALIST

## 2021-08-06 PROCEDURE — 85025 COMPLETE CBC W/AUTO DIFF WBC: CPT

## 2021-08-06 PROCEDURE — 99218 HC RM OBSERVATION: CPT

## 2021-08-06 RX ORDER — KETOROLAC TROMETHAMINE 15 MG/ML
30 INJECTION, SOLUTION INTRAMUSCULAR; INTRAVENOUS
Status: DISPENSED | OUTPATIENT
Start: 2021-08-06 | End: 2021-08-07

## 2021-08-06 RX ADMIN — METOCLOPRAMIDE HYDROCHLORIDE 5 MG: 5 INJECTION INTRAMUSCULAR; INTRAVENOUS at 17:54

## 2021-08-06 RX ADMIN — Medication 10 ML: at 20:34

## 2021-08-06 RX ADMIN — POTASSIUM CHLORIDE AND SODIUM CHLORIDE: 900; 150 INJECTION, SOLUTION INTRAVENOUS at 15:58

## 2021-08-06 RX ADMIN — KETOROLAC TROMETHAMINE 15 MG: 15 INJECTION, SOLUTION INTRAMUSCULAR; INTRAVENOUS at 09:14

## 2021-08-06 RX ADMIN — ONDANSETRON 4 MG: 2 INJECTION INTRAMUSCULAR; INTRAVENOUS at 15:48

## 2021-08-06 RX ADMIN — METOCLOPRAMIDE HYDROCHLORIDE 5 MG: 5 INJECTION INTRAMUSCULAR; INTRAVENOUS at 01:18

## 2021-08-06 RX ADMIN — KETOROLAC TROMETHAMINE 30 MG: 15 INJECTION, SOLUTION INTRAMUSCULAR; INTRAVENOUS at 15:48

## 2021-08-06 RX ADMIN — FAMOTIDINE 20 MG: 10 INJECTION, SOLUTION INTRAVENOUS at 09:13

## 2021-08-06 RX ADMIN — ONDANSETRON 4 MG: 2 INJECTION INTRAMUSCULAR; INTRAVENOUS at 08:09

## 2021-08-06 RX ADMIN — METOCLOPRAMIDE HYDROCHLORIDE 5 MG: 5 INJECTION INTRAMUSCULAR; INTRAVENOUS at 06:42

## 2021-08-06 RX ADMIN — FAMOTIDINE 20 MG: 10 INJECTION, SOLUTION INTRAVENOUS at 20:34

## 2021-08-06 RX ADMIN — PROMETHAZINE HYDROCHLORIDE 12.5 MG: 25 INJECTION INTRAMUSCULAR; INTRAVENOUS at 10:22

## 2021-08-06 RX ADMIN — POTASSIUM CHLORIDE AND SODIUM CHLORIDE: 900; 150 INJECTION, SOLUTION INTRAVENOUS at 07:52

## 2021-08-06 RX ADMIN — Medication 10 ML: at 06:42

## 2021-08-06 RX ADMIN — METOCLOPRAMIDE HYDROCHLORIDE 5 MG: 5 INJECTION INTRAMUSCULAR; INTRAVENOUS at 13:02

## 2021-08-06 RX ADMIN — PROMETHAZINE HYDROCHLORIDE 12.5 MG: 25 INJECTION INTRAMUSCULAR; INTRAVENOUS at 17:54

## 2021-08-06 NOTE — PROGRESS NOTES
Patient states toradol   Given for pain earlier has not helped and still has nausea and request to see a GI doctor.  Will inform MD

## 2021-08-06 NOTE — PROGRESS NOTES
Informed dr. Luisana Gunderson that patient states that the toradol given is not helping and that she is still vomiting and in pain

## 2021-08-06 NOTE — PROGRESS NOTES
Patient drank 4oz of apple juice and begin to feel nausea and severe pain rated  at a 8 in abdomen.  Patient given nausea and pain meds(see MAR)

## 2021-08-06 NOTE — PROGRESS NOTES
Hospitalist Progress Note               Daily Progress Note: 2021      Subjective: The patient is seen for follow  up. Marcela Daniel a 31 y.o. female who presents with no significant past medical history presents with intractable nausea and vomiting. Patient continues to have nausea and vomiting. Patient is also complaining of epigastric pain. She denies any fever. Does have mild chills. Medications reviewed  Current Facility-Administered Medications   Medication Dose Route Frequency    ketorolac (TORADOL) injection 30 mg  30 mg IntraVENous Q6H PRN    sodium chloride (NS) flush 5-40 mL  5-40 mL IntraVENous Q8H    sodium chloride (NS) flush 5-40 mL  5-40 mL IntraVENous PRN    acetaminophen (TYLENOL) tablet 650 mg  650 mg Oral Q6H PRN    Or    acetaminophen (TYLENOL) suppository 650 mg  650 mg Rectal Q6H PRN    polyethylene glycol (MIRALAX) packet 17 g  17 g Oral DAILY PRN    ondansetron (ZOFRAN ODT) tablet 4 mg  4 mg Oral Q8H PRN    Or    ondansetron (ZOFRAN) injection 4 mg  4 mg IntraVENous Q6H PRN    metoclopramide HCl (REGLAN) injection 5 mg  5 mg IntraVENous Q6H    0.9% sodium chloride with KCl 20 mEq/L infusion   IntraVENous CONTINUOUS    promethazine (PHENERGAN) 12.5 mg in 0.9% sodium chloride 50 mL IVPB  12.5 mg IntraVENous Q6H PRN    famotidine (PF) (PEPCID) 20 mg in 0.9% sodium chloride 10 mL injection  20 mg IntraVENous Q12H    hydrALAZINE (APRESOLINE) 20 mg/mL injection 10 mg  10 mg IntraVENous Q6H PRN    enoxaparin (LOVENOX) injection 40 mg  40 mg SubCUTAneous DAILY       Review of Systems:   A comprehensive review of systems was negative.     Objective:   Physical Exam:     Visit Vitals  BP (!) 146/93 (BP 1 Location: Left upper arm, BP Patient Position: At rest;Supine)   Pulse 71   Temp 98 °F (36.7 °C)   Resp 18   Ht 5' 7\" (1.702 m)   Wt 63.5 kg (139 lb 15.9 oz)   SpO2 100%   BMI 21.93 kg/m²      O2 Device: None (Room air)    Temp (24hrs), Av.8 °F (37.1 °C), Min:97.6 °F (36.4 °C), Max:100.4 °F (38 °C)    No intake/output data recorded. No intake/output data recorded. PHYSICAL EXAM:  General: Alert and awake  Skin: Extremities and face reveal no rashes. HEENT: Sclerae anicteric. Extra-occular muscles are intact. No oral ulcers. No ENT discharge. The neck is supple. Cardiovascular: Regular rate and rhythm. No murmurs, gallops, or rubs. PMI nondisplaced. Carotids without bruits. Respiratory: Comfortable breathing with no accessory muscle use. Clear breath sounds with no wheezes, rales, or rhonchi. GI: Abdomen nondistended, soft, and mild epigastric tenderness. Normal active bowel sounds. No enlargement of the liver or spleen. No masses palpable. Rectal: Deferred   Musculoskeletal: No pitting edema of the lower legs. Extremities have good range of motion. No costovertebral tenderness. Neurological: Gross memory appears intact. Patient is alert and oriented. Power 5/5, Cranial nerves II-XII intact  Psychiatric: Mood appears appropriate with judgement intact. Lymphatic: No cervical or supraclavicular adenopathy. Data Review:       Recent Days:  Recent Labs     08/06/21  0250 08/05/21  0905   WBC 6.4 6.7   HGB 13.4 16.0   HCT 40.1 47.4*    301     Recent Labs     08/06/21  0250 08/05/21  0905    136   K 4.2 3.4*    102   CO2 24 24   * 92   BUN 11 10   CREA 0.70 0.90   CA 8.4* 9.6   MG 2.2 2.2   ALB  --  4.6   TBILI  --  1.0   ALT  --  27     No results for input(s): PH, PCO2, PO2, HCO3, FIO2 in the last 72 hours.         XR ABD (KUB)   Final Result             Assessment/     Problem List:  Hospital Problems  Never Reviewed        Codes Class Noted POA    Hypokalemia ICD-10-CM: E87.6  ICD-9-CM: 276.8  8/5/2021 Unknown        Intractable nausea and vomiting ICD-10-CM: R11.2  ICD-9-CM: 536.2  8/5/2021 Unknown                     Plan:  Continue patient on IV hydration  Continue clear liquid diet  Phenergan and Zofran alternate  GI evaluation  Continue patient on Reglan  Further plan of management depend upon patient's clinical course in the hospital    Safety:  Code Status: Full Code   DVT prophylaxis: Lovenox  Stress Ulcer prophylaxis: Pepcid  Bladder catheter: None  Family Contact Info:    Disposition: Home  Consults: GI    Care Plan discussed with: Patient/Family, Nurse and   Ba Covington MD

## 2021-08-06 NOTE — PROGRESS NOTES
Two nurse skin assessment is performed by myself and Delaney Hickman RN. Patients skin is clean, dry and intact with no signs of breakdown noted.

## 2021-08-06 NOTE — H&P
History & Physical    Primary Care Provider: None  Source of Information: Patient     History of Presenting Illness:   Norma Bravo is a 31 y.o. female who presents with no significant past medical history presents with intractable nausea and vomiting. She states has been going on since last Sunday, approximately 5 days ago. She feels that while at a family gathering and she ate some food that had been out all day. There has been no hematemesis. She has had some mid and upper abdominal discomfort. She denies diarrhea. She has been in the ER all day. She received fluid boluses as were as alternating antiemetics without relief and is unable to maintain oral intake therefore she is referred to us for admission. Review of Systems:  Review of Systems   Constitutional: Positive for malaise/fatigue. Negative for chills and fever. HENT: Negative. Eyes: Negative. Respiratory: Negative. Cardiovascular: Negative. Gastrointestinal: Positive for abdominal pain, heartburn, nausea and vomiting. Negative for blood in stool, constipation, diarrhea and melena. Genitourinary: Negative. She is currently on her menses   Musculoskeletal: Negative. Skin: Negative. Neurological: Negative. Endo/Heme/Allergies: Negative. Psychiatric/Behavioral: Negative. History reviewed. No pertinent past medical history. Denies prior medical history  History reviewed. No pertinent surgical history. Denies any prior surgical history  Prior to Admission medications    Medication Sig Start Date End Date Taking? Authorizing Provider   dicyclomine (BENTYL) 10 mg capsule Take 1 Capsule by mouth four (4) times daily for 7 days. 8/5/21 8/12/21 Yes Tania Preston PA-C   promethazine (PHENERGAN) 25 mg suppository Insert 1 Suppository into rectum every six (6) hours as needed for Nausea for up to 7 days.  8/5/21 8/12/21 Yes Lis Stephenson PA-C   She is not currently on any medications at home  Allergies   Allergen Reactions    Penicillins Unknown (comments)      History reviewed. No pertinent family history. SOCIAL HISTORY:  Patient resides:  Independently x   Assisted Living    SNF    With family care       Smoking history:   None x   Former    Chronic      Alcohol history:   None x   Social    Chronic      Ambulates:   Independently x   w/cane    w/walker    w/wc    CODE STATUS:  DNR    Full x   Other      Objective:     Physical Exam:     Visit Vitals  BP (!) 139/91 (BP 1 Location: Left upper arm, BP Patient Position: Supine; At rest)   Pulse 69   Temp 100.4 °F (38 °C)   Resp 18   Ht 5' 7\" (1.702 m)   Wt 63.5 kg (139 lb 15.9 oz)   SpO2 99%   BMI 21.93 kg/m²      O2 Device: None (Room air)    General:  Alert, cooperative, she looks weak, not distressed   Head:  Normocephalic, without obvious abnormality, atraumatic. Eyes:  Conjunctivae/corneas clear. EOMs intact. Throat:  Oral mucosa is moist   Neck: Supple, symmetrical, trachea midline, no adenopathy, thyroid: no enlargement/tenderness/nodules, no carotid bruit and no JVD. Back:   Symmetric, no curvature. ROM normal. No CVA tenderness. Lungs:   Clear to auscultation bilaterally. Chest wall:  No tenderness or deformity. Heart:  Regular rate and rhythm, S1, S2 normal, no murmur, click, rub or gallop. Abdomen:   Soft, epigastric tenderness without rebound or guarding. Bowel sounds normal. No masses,  No organomegaly. Extremities: Extremities normal, atraumatic, no cyanosis or edema. Pulses: 2+ and symmetric all extremities. Skin: Skin color, texture, turgor normal. No rashes or lesions   Neurologic: CNII-XII intact. No motor or sensory deficits.             Data Review:     Recent Days:  Recent Labs     08/05/21  0905   WBC 6.7   HGB 16.0   HCT 47.4*        Recent Labs     08/05/21  0905      K 3.4*      CO2 24   GLU 92   BUN 10   CREA 0.90   CA 9.6   MG 2.2   ALB 4.6   TBILI 1.0   ALT 27 No results for input(s): PH, PCO2, PO2, HCO3, FIO2 in the last 72 hours. 24 Hour Results:  Recent Results (from the past 24 hour(s))   LIPASE    Collection Time: 08/05/21  9:05 AM   Result Value Ref Range    Lipase 76 73 - 988 U/L   METABOLIC PANEL, COMPREHENSIVE    Collection Time: 08/05/21  9:05 AM   Result Value Ref Range    Sodium 136 136 - 145 mmol/L    Potassium 3.4 (L) 3.5 - 5.1 mmol/L    Chloride 102 97 - 108 mmol/L    CO2 24 21 - 32 mmol/L    Anion gap 10 5 - 15 mmol/L    Glucose 92 65 - 100 mg/dL    BUN 10 6 - 20 mg/dL    Creatinine 0.90 0.55 - 1.02 mg/dL    BUN/Creatinine ratio 11 (L) 12 - 20      GFR est AA >60 >60 ml/min/1.73m2    GFR est non-AA >60 >60 ml/min/1.73m2    Calcium 9.6 8.5 - 10.1 mg/dL    Bilirubin, total 1.0 0.2 - 1.0 mg/dL    AST (SGOT) 9 (L) 15 - 37 U/L    ALT (SGPT) 27 12 - 78 U/L    Alk. phosphatase 64 45 - 117 U/L    Protein, total 8.6 (H) 6.4 - 8.2 g/dL    Albumin 4.6 3.5 - 5.0 g/dL    Globulin 4.0 2.0 - 4.0 g/dL    A-G Ratio 1.2 1.1 - 2.2     CBC WITH AUTOMATED DIFF    Collection Time: 08/05/21  9:05 AM   Result Value Ref Range    WBC 6.7 3.6 - 11.0 K/uL    RBC 5.20 3.80 - 5.20 M/uL    HGB 16.0 11.5 - 16.0 g/dL    HCT 47.4 (H) 35.0 - 47.0 %    MCV 91.2 80.0 - 99.0 FL    MCH 30.8 26.0 - 34.0 PG    MCHC 33.8 30.0 - 36.5 g/dL    RDW 11.9 11.5 - 14.5 %    PLATELET 053 445 - 692 K/uL    MPV 10.2 8.9 - 12.9 FL    NRBC 0.0 0.0  WBC    ABSOLUTE NRBC 0.00 0.00 - 0.01 K/uL    NEUTROPHILS 76 (H) 32 - 75 %    LYMPHOCYTES 19 12 - 49 %    MONOCYTES 5 5 - 13 %    EOSINOPHILS 0 0 - 7 %    BASOPHILS 0 0 - 1 %    IMMATURE GRANULOCYTES 0 0 - 0.5 %    ABS. NEUTROPHILS 5.1 1.8 - 8.0 K/UL    ABS. LYMPHOCYTES 1.3 0.8 - 3.5 K/UL    ABS. MONOCYTES 0.3 0.0 - 1.0 K/UL    ABS. EOSINOPHILS 0.0 0.0 - 0.4 K/UL    ABS. BASOPHILS 0.0 0.0 - 0.1 K/UL    ABS. IMM.  GRANS. 0.0 0.00 - 0.04 K/UL    DF AUTOMATED     HCG QL SERUM    Collection Time: 08/05/21  9:05 AM   Result Value Ref Range    HCG, Ql. Negative Negative     MAGNESIUM    Collection Time: 08/05/21  9:05 AM   Result Value Ref Range    Magnesium 2.2 1.6 - 2.4 mg/dL   URINALYSIS W/ REFLEX CULTURE    Collection Time: 08/05/21 10:40 AM    Specimen: Urine   Result Value Ref Range    Color Yellow/Straw      Appearance Turbid (A) Clear      Specific gravity 1.024 1.003 - 1.030      pH (UA) 6.0 5.0 - 8.0      Protein Negative Negative mg/dL    Glucose Negative Negative mg/dL    Ketone 80 (A) Negative mg/dL    Bilirubin Negative Negative      Blood Moderate (A) Negative      Urobilinogen 4.0 (H) 0.1 - 1.0 EU/dL    Nitrites Negative Negative      Leukocyte Esterase Negative Negative      UA:UC IF INDICATED Culture not indicated by UA result Culture not indicated by UA result      WBC 0-4 0 - 4 /hpf    RBC 0-5 0 - 5 /hpf    Bacteria Negative Negative /hpf    Mucus 1+ /lpf         Imaging:     Assessment:     Active Problems:    Hypokalemia (8/5/2021)      Intractable nausea and vomiting (8/5/2021)       48-year-old female with no prior medical or surgical history presents with intractable nauseaand vomiting going on now for about 5 days. She has been able to maintain some oral intake,  althogh does appear dehydrated  and renal function is preserved. She was here in the emergency room for approximately 9 hours receiving fluid boluses and alternating antiemetics without relief. Admitting secondary to intractable nausea and vomiting and hypokalemia likely secondary to food poisoning. Epigastric tenderness is likely secondary to intractable nausea and vomiting and likely some underlying gastritis. No diarrhea likely not staph. Plan:     -Observation admission  -Judicious hydration  -Reglan and will continue with alternating Phenergan and ondansetron  -Replace and follow electrolytes  -Reglan 20 mg IV twice daily  -Clear liquid diet and will advance as tolerated  -dexamethasone 6mg x 1  Gi cx if no better & consider ct abd, KUB. reviewed.     VTEP: Lovenox  GIP: Pepcid  Full CODE STATUS  Disposition: Pending course, observation admission, home in the morning if we can get her to tolerate oral intake.     Signed By: Ruth Boyer MD     August 5, 2021

## 2021-08-06 NOTE — PROGRESS NOTES
Reason for Admission:  Hypokalemia                     RUR Score:          N/A           Plan for utilizing home health:      Patient politely declines    PCP: First and Last name:  None     Name of Practice:    Are you a current patient: Yes/No:    Approximate date of last visit:    Can you participate in a virtual visit with your PCP:                     Current Advanced Directive/Advance Care Plan: Full Code      Healthcare Decision Maker:   Click here to complete [address] including selection of the Healthcare Decision Maker Relationship (ie \"Primary\")           Patient states she has no one to make decisions for her. Transition of Care Plan:                      Patient lives in [place] in her own home with her boyfriend. 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 She can transport herself to follow-ups.   Current Dispo: Home/Self

## 2021-08-07 ENCOUNTER — APPOINTMENT (OUTPATIENT)
Dept: CT IMAGING | Age: 26
DRG: 392 | End: 2021-08-07
Attending: HOSPITALIST
Payer: OTHER GOVERNMENT

## 2021-08-07 PROBLEM — R11.2 NAUSEA & VOMITING: Status: ACTIVE | Noted: 2021-08-07

## 2021-08-07 PROCEDURE — 74011000258 HC RX REV CODE- 258: Performed by: INTERNAL MEDICINE

## 2021-08-07 PROCEDURE — 96376 TX/PRO/DX INJ SAME DRUG ADON: CPT

## 2021-08-07 PROCEDURE — 74011250637 HC RX REV CODE- 250/637: Performed by: INTERNAL MEDICINE

## 2021-08-07 PROCEDURE — 65270000029 HC RM PRIVATE

## 2021-08-07 PROCEDURE — 74011250636 HC RX REV CODE- 250/636: Performed by: HOSPITALIST

## 2021-08-07 PROCEDURE — 96375 TX/PRO/DX INJ NEW DRUG ADDON: CPT

## 2021-08-07 PROCEDURE — 74176 CT ABD & PELVIS W/O CONTRAST: CPT

## 2021-08-07 PROCEDURE — 99218 HC RM OBSERVATION: CPT

## 2021-08-07 PROCEDURE — 74011250636 HC RX REV CODE- 250/636: Performed by: INTERNAL MEDICINE

## 2021-08-07 RX ORDER — DOCUSATE SODIUM 100 MG/1
100 CAPSULE, LIQUID FILLED ORAL 2 TIMES DAILY
Status: DISCONTINUED | OUTPATIENT
Start: 2021-08-07 | End: 2021-08-09 | Stop reason: HOSPADM

## 2021-08-07 RX ORDER — CIPROFLOXACIN 2 MG/ML
400 INJECTION, SOLUTION INTRAVENOUS EVERY 12 HOURS
Status: DISCONTINUED | OUTPATIENT
Start: 2021-08-07 | End: 2021-08-09 | Stop reason: HOSPADM

## 2021-08-07 RX ORDER — POLYETHYLENE GLYCOL 3350 17 G/17G
17 POWDER, FOR SOLUTION ORAL DAILY
Status: DISCONTINUED | OUTPATIENT
Start: 2021-08-08 | End: 2021-08-09 | Stop reason: HOSPADM

## 2021-08-07 RX ADMIN — ACETAMINOPHEN 650 MG: 325 TABLET ORAL at 17:02

## 2021-08-07 RX ADMIN — PROMETHAZINE HYDROCHLORIDE 12.5 MG: 25 INJECTION INTRAMUSCULAR; INTRAVENOUS at 06:50

## 2021-08-07 RX ADMIN — METOCLOPRAMIDE HYDROCHLORIDE 5 MG: 5 INJECTION INTRAMUSCULAR; INTRAVENOUS at 05:58

## 2021-08-07 RX ADMIN — Medication 10 ML: at 16:05

## 2021-08-07 RX ADMIN — KETOROLAC TROMETHAMINE 30 MG: 15 INJECTION, SOLUTION INTRAMUSCULAR; INTRAVENOUS at 00:43

## 2021-08-07 RX ADMIN — PROMETHAZINE HYDROCHLORIDE 12.5 MG: 25 INJECTION INTRAMUSCULAR; INTRAVENOUS at 19:47

## 2021-08-07 RX ADMIN — Medication 10 ML: at 19:55

## 2021-08-07 RX ADMIN — FAMOTIDINE 20 MG: 10 INJECTION, SOLUTION INTRAVENOUS at 09:49

## 2021-08-07 RX ADMIN — KETOROLAC TROMETHAMINE 30 MG: 15 INJECTION, SOLUTION INTRAMUSCULAR; INTRAVENOUS at 13:17

## 2021-08-07 RX ADMIN — Medication 10 ML: at 05:59

## 2021-08-07 RX ADMIN — CIPROFLOXACIN 400 MG: 2 INJECTION, SOLUTION INTRAVENOUS at 13:17

## 2021-08-07 RX ADMIN — ONDANSETRON 4 MG: 2 INJECTION INTRAMUSCULAR; INTRAVENOUS at 00:42

## 2021-08-07 RX ADMIN — ONDANSETRON 4 MG: 2 INJECTION INTRAMUSCULAR; INTRAVENOUS at 17:06

## 2021-08-07 RX ADMIN — METOCLOPRAMIDE HYDROCHLORIDE 5 MG: 5 INJECTION INTRAMUSCULAR; INTRAVENOUS at 13:17

## 2021-08-07 RX ADMIN — METOCLOPRAMIDE HYDROCHLORIDE 5 MG: 5 INJECTION INTRAMUSCULAR; INTRAVENOUS at 00:44

## 2021-08-07 RX ADMIN — METOCLOPRAMIDE HYDROCHLORIDE 5 MG: 5 INJECTION INTRAMUSCULAR; INTRAVENOUS at 17:02

## 2021-08-07 RX ADMIN — FAMOTIDINE 20 MG: 10 INJECTION, SOLUTION INTRAVENOUS at 19:47

## 2021-08-07 NOTE — CONSULTS
Consult    Patient: Norma Bravo MRN: [id number]  SSN: [id number]    YOB: 1995  Age: 31 y.o. Sex: female      Subjective:      Norma Bravo is a 31 y.o. female who is being seen for abdominal nausea vomiting,  with no significant past medical history presents with intractable nausea and vomiting. There has been no hematemesis. She has had some mid and upper abdominal discomfort. She denies diarrhea. ere as alternating antiemetic but still  has some pain   History reviewed. No pertinent past medical history. Dear history of diabetes high blood pressure celiacdisease, IBD,  History reviewed. No pertinent surgical history. History reviewed. No pertinent family history.   Social History     Tobacco Use    Smoking status: Never Smoker    Smokeless tobacco: Never Used   Substance Use Topics    Alcohol use: Not on file      Current Facility-Administered Medications   Medication Dose Route Frequency Provider Last Rate Last Admin    ketorolac (TORADOL) injection 30 mg  30 mg IntraVENous Q6H PRN Garcia Parrish MD   30 mg at 08/06/21 1548    sodium chloride (NS) flush 5-40 mL  5-40 mL IntraVENous Q8H Keenan Lamb MD   10 mL at 08/06/21 2034    sodium chloride (NS) flush 5-40 mL  5-40 mL IntraVENous PRN Mathew Rdz MD        acetaminophen (TYLENOL) tablet 650 mg  650 mg Oral Q6H PRN Jael RICE MD   650 mg at 08/05/21 2151    Or    acetaminophen (TYLENOL) suppository 650 mg  650 mg Rectal Q6H PRN Mathew Rdz MD        polyethylene glycol (MIRALAX) packet 17 g  17 g Oral DAILY PRN Mathew Rdz MD        ondansetron (ZOFRAN ODT) tablet 4 mg  4 mg Oral Q8H PRN Jael RICE MD        Or    [facility] injection 4 mg  4 mg IntraVENous Q6H PRN Jael RICE MD   4 mg at 08/06/21 1548    metoclopramide HCl (REGLAN) injection 5 mg  5 mg IntraVENous Q6H Keenan Lamb MD   5 mg at 08/06/21 1754    0.9% sodium chloride with KCl 20 mEq/L infusion   IntraVENous CONTINUOUS Jael RICE  mL/hr at 08/06/21 1558 New Bag at 08/06/21 1558    promethazine (PHENERGAN) 12.5 mg in 0.9% sodium chloride 50 mL IVPB  12.5 mg IntraVENous Q6H PRN Jael RICE  mL/hr at 08/06/21 1754 12.5 mg at 08/06/21 1754    famotidine (PF) (PEPCID) 20 mg in 0.9% sodium chloride 10 mL injection  20 mg IntraVENous Q12H Jael RICE MD   20 mg at 08/06/21 2034    hydrALAZINE (APRESOLINE) 20 mg/mL injection 10 mg  10 mg IntraVENous Q6H PRN Mathew Rdz MD        enoxaparin (LOVENOX) injection 40 mg  40 mg SubCUTAneous DAILY Mathew Rdz MD            Allergies   Allergen Reactions    Penicillins Unknown (comments)       Review of Systems:  Review of Systems   Constitutional: Negative. Eyes: Negative. Respiratory: Positive for cough. Cardiovascular: Negative. Gastrointestinal: Positive for abdominal pain, heartburn, nausea and vomiting. Genitourinary: Negative. Musculoskeletal: Negative. Negative for back pain. Neurological: Negative. Endo/Heme/Allergies: Negative. Psychiatric/Behavioral: Negative. Objective:     Vitals:    08/05/21 2340 08/06/21 0802 08/06/21 1455 08/06/21 2033   BP: (!) 98/59 (!) 149/85 (!) 146/93 120/76   Pulse: 79 (!) 59 71 61   Resp: 18 18 18 17   Temp: 99.2 °F (37.3 °C) 97.6 °F (36.4 °C) 98 °F (36.7 °C) 98.1 °F (36.7 °C)   SpO2: 98% 97% 100% 98%   Weight:       Height:            Physical Exam:  Physical Exam  Constitutional:       Appearance: She is ill-appearing. HENT:      Head: Normocephalic and atraumatic. Mouth/Throat:      Mouth: Mucous membranes are dry. Cardiovascular:      Rate and Rhythm: Normal rate. Pulses: Normal pulses. Pulmonary:      Effort: Pulmonary effort is normal.      Breath sounds: No wheezing. Abdominal:      General: Abdomen is flat. There is no distension. Tenderness: There is no abdominal tenderness. There is no guarding. Musculoskeletal:      Cervical back: Normal range of motion. Skin:     Findings: No bruising or lesion. Neurological:      General: No focal deficit present. Recent Results (from the past 24 hour(s))   METABOLIC PANEL, BASIC    Collection Time: 08/06/21  2:50 AM   Result Value Ref Range    Sodium 136 136 - 145 mmol/L    Potassium 4.2 3.5 - 5.1 mmol/L    Chloride 106 97 - 108 mmol/L    CO2 24 21 - 32 mmol/L    Anion gap 6 5 - 15 mmol/L    Glucose 104 (H) 65 - 100 mg/dL    BUN 11 6 - 20 mg/dL    Creatinine 0.70 0.55 - 1.02 mg/dL    BUN/Creatinine ratio 16 12 - 20      GFR est AA >60 >60 ml/min/1.73m2    GFR est non-AA >60 >60 ml/min/1.73m2    Calcium 8.4 (L) 8.5 - 10.1 mg/dL   MAGNESIUM    Collection Time: 08/06/21  2:50 AM   Result Value Ref Range    Magnesium 2.2 1.6 - 2.4 mg/dL   CBC WITH AUTOMATED DIFF    Collection Time: 08/06/21  2:50 AM   Result Value Ref Range    WBC 6.4 3.6 - 11.0 K/uL    RBC 4.34 3.80 - 5.20 M/uL    HGB 13.4 11.5 - 16.0 g/dL    HCT 40.1 35.0 - 47.0 %    MCV 92.4 80.0 - 99.0 FL    MCH 30.9 26.0 - 34.0 PG    MCHC 33.4 30.0 - 36.5 g/dL    RDW 12.0 11.5 - 14.5 %    PLATELET 628 911 - 339 K/uL    MPV 11.1 8.9 - 12.9 FL    NRBC 0.0 0.0  WBC    ABSOLUTE NRBC 0.00 0.00 - 0.01 K/uL    NEUTROPHILS 90 (H) 32 - 75 %    LYMPHOCYTES 9 (L) 12 - 49 %    MONOCYTES 1 (L) 5 - 13 %    EOSINOPHILS 0 0 - 7 %    BASOPHILS 0 0 - 1 %    IMMATURE GRANULOCYTES 1 (H) 0 - 0.5 %    ABS. NEUTROPHILS 5.8 1.8 - 8.0 K/UL    ABS. LYMPHOCYTES 0.6 (L) 0.8 - 3.5 K/UL    ABS. MONOCYTES 0.1 0.0 - 1.0 K/UL    ABS. EOSINOPHILS 0.0 0.0 - 0.4 K/UL    ABS. BASOPHILS 0.0 0.0 - 0.1 K/UL    ABS. IMM.  GRANS. 0.0 0.00 - 0.04 K/UL    DF AUTOMATED          XR ABD (KUB)   Final Result         Assessment:     Hospital Problems  Never Reviewed        Codes Class Noted POA    Hypokalemia ICD-10-CM: E87.6  ICD-9-CM: 276.8  8/5/2021 Unknown        Intractable nausea and vomiting ICD-10-CM: R11.2  ICD-9-CM: 536.2  8/5/2021 Unknown           with intractable nauseaand vomiting      hypokalemia likely secondary to food poisoning.   pigastric tenderness   underlying gastritis  Rule out gastric ulcers  Plan:    Iv hydration  -Phenergan and ondansetron  --Clear liquid diet and will advance as tolerated           Abdominal ultrasound  Ordered  Lipase/amylase   Signed By: Rosales Molina MD     August 6, 2021         Thank you for allowing me to participate in this patients care  Cc Referring Physician   None

## 2021-08-07 NOTE — PROGRESS NOTES
Hospitalist Progress Note               Daily Progress Note: 8/7/2021      Subjective: The patient is seen for follow  up. Anna Ray a 31 y.o. female who presents with no significant past medical history presents with intractable nausea and vomiting. Patient continues to have nausea and vomiting. Patient is also complaining of epigastric pain. She denies any fever. Does have mild chills. Medications reviewed  Current Facility-Administered Medications   Medication Dose Route Frequency    ciprofloxacin (CIPRO) 400 mg in D5W IVPB (premix)  400 mg IntraVENous Q12H    [START ON 8/8/2021] polyethylene glycol (MIRALAX) packet 17 g  17 g Oral DAILY    ketorolac (TORADOL) injection 30 mg  30 mg IntraVENous Q6H PRN    sodium chloride (NS) flush 5-40 mL  5-40 mL IntraVENous Q8H    sodium chloride (NS) flush 5-40 mL  5-40 mL IntraVENous PRN    acetaminophen (TYLENOL) tablet 650 mg  650 mg Oral Q6H PRN    Or    acetaminophen (TYLENOL) suppository 650 mg  650 mg Rectal Q6H PRN    ondansetron (ZOFRAN ODT) tablet 4 mg  4 mg Oral Q8H PRN    Or    ondansetron (ZOFRAN) injection 4 mg  4 mg IntraVENous Q6H PRN    metoclopramide HCl (REGLAN) injection 5 mg  5 mg IntraVENous Q6H    0.9% sodium chloride with KCl 20 mEq/L infusion   IntraVENous CONTINUOUS    promethazine (PHENERGAN) 12.5 mg in 0.9% sodium chloride 50 mL IVPB  12.5 mg IntraVENous Q6H PRN    famotidine (PF) (PEPCID) 20 mg in 0.9% sodium chloride 10 mL injection  20 mg IntraVENous Q12H    hydrALAZINE (APRESOLINE) 20 mg/mL injection 10 mg  10 mg IntraVENous Q6H PRN    enoxaparin (LOVENOX) injection 40 mg  40 mg SubCUTAneous DAILY       Review of Systems:   A comprehensive review of systems was negative.     Objective:   Physical Exam:     Visit Vitals  BP (!) 155/88 (BP 1 Location: Left upper arm, BP Patient Position: At rest;Semi fowlers)   Pulse 69   Temp 99 °F (37.2 °C)   Resp 18   Ht 5' 7\" (1.702 m)   Wt 63.5 kg (139 lb 15.9 oz)   SpO2 100%   BMI 21.93 kg/m²      O2 Device: None (Room air)    Temp (24hrs), Av.4 °F (36.9 °C), Min:98 °F (36.7 °C), Max:99 °F (37.2 °C)    No intake/output data recorded.  190 -  0700  In: 400 [P.O.:400]  Out: -     PHYSICAL EXAM:  General: Alert and awake  Skin: Extremities and face reveal no rashes. HEENT: Sclerae anicteric. Extra-occular muscles are intact. No oral ulcers. No ENT discharge. The neck is supple. Cardiovascular: Regular rate and rhythm. No murmurs, gallops, or rubs. PMI nondisplaced. Carotids without bruits. Respiratory: Comfortable breathing with no accessory muscle use. Clear breath sounds with no wheezes, rales, or rhonchi. GI: Abdomen nondistended, soft, and mild epigastric tenderness. Normal active bowel sounds. No enlargement of the liver or spleen. No masses palpable. Rectal: Deferred   Musculoskeletal: No pitting edema of the lower legs. Extremities have good range of motion. No costovertebral tenderness. Neurological: Gross memory appears intact. Patient is alert and oriented. Power 5/5, Cranial nerves II-XII intact  Psychiatric: Mood appears appropriate with judgement intact. Lymphatic: No cervical or supraclavicular adenopathy. Data Review:       Recent Days:  Recent Labs     21  0250 21  0905   WBC 6.4 6.7   HGB 13.4 16.0   HCT 40.1 47.4*    301     Recent Labs     21  0250 21  0905    136   K 4.2 3.4*    102   CO2 24 24   * 92   BUN 11 10   CREA 0.70 0.90   CA 8.4* 9.6   MG 2.2 2.2   ALB  --  4.6   TBILI  --  1.0   ALT  --  27     No results for input(s): PH, PCO2, PO2, HCO3, FIO2 in the last 72 hours.         XR ABD (KUB)   Final Result      US ABD LTD    (Results Pending)   CT ABD PELV WO CONT    (Results Pending)          Assessment/     Problem List:  Hospital Problems  Never Reviewed        Codes Class Noted POA    Hypokalemia ICD-10-CM: E87.6  ICD-9-CM: 276.8  2021 Unknown        Intractable nausea and vomiting ICD-10-CM: R11.2  ICD-9-CM: 536.2  8/5/2021 Unknown                     Plan:  Continue patient on IV hydration  Continue clear liquid diet  Phenergan and Zofran alternate  GI evaluation  Continue patient on Reglan  Will get CT abd  Start empirically on cipro  Further plan of management depend upon patient's clinical course in the hospital    Safety:  Code Status: Full Code   DVT prophylaxis: Lovenox  Stress Ulcer prophylaxis: Pepcid  Bladder catheter: None  Family Contact Info:    Disposition: Home  Consults: GI    Care Plan discussed with: Patient/Family, Nurse and   Ananth Carmichael MD

## 2021-08-07 NOTE — PROGRESS NOTES
Problem: Falls - Risk of  Goal: *Absence of Falls  Description: Document Star Larios Fall Risk and appropriate interventions in the flowsheet.   Outcome: Progressing Towards Goal  Note: Fall Risk Interventions:  Mobility Interventions: Bed/chair exit alarm         Medication Interventions: Bed/chair exit alarm

## 2021-08-07 NOTE — PROGRESS NOTES
Bedside shift change report given to Debi Koroma RN (oncoming nurse) by Leo Ashby LPN (offgoing nurse). Report included the following information SBAR, Intake/Output and Recent Results.

## 2021-08-08 PROCEDURE — 74011000250 HC RX REV CODE- 250: Performed by: INTERNAL MEDICINE

## 2021-08-08 PROCEDURE — 65270000029 HC RM PRIVATE

## 2021-08-08 PROCEDURE — 74011000258 HC RX REV CODE- 258: Performed by: INTERNAL MEDICINE

## 2021-08-08 PROCEDURE — 74011250636 HC RX REV CODE- 250/636: Performed by: HOSPITALIST

## 2021-08-08 PROCEDURE — 74011250637 HC RX REV CODE- 250/637: Performed by: HOSPITALIST

## 2021-08-08 PROCEDURE — 74011250637 HC RX REV CODE- 250/637: Performed by: INTERNAL MEDICINE

## 2021-08-08 PROCEDURE — 74011250636 HC RX REV CODE- 250/636: Performed by: INTERNAL MEDICINE

## 2021-08-08 RX ADMIN — PROMETHAZINE HYDROCHLORIDE 12.5 MG: 25 INJECTION INTRAMUSCULAR; INTRAVENOUS at 10:45

## 2021-08-08 RX ADMIN — ONDANSETRON 4 MG: 2 INJECTION INTRAMUSCULAR; INTRAVENOUS at 04:18

## 2021-08-08 RX ADMIN — CIPROFLOXACIN 400 MG: 2 INJECTION, SOLUTION INTRAVENOUS at 01:06

## 2021-08-08 RX ADMIN — FAMOTIDINE 20 MG: 10 INJECTION, SOLUTION INTRAVENOUS at 10:45

## 2021-08-08 RX ADMIN — ONDANSETRON 4 MG: 4 TABLET, ORALLY DISINTEGRATING ORAL at 20:38

## 2021-08-08 RX ADMIN — FAMOTIDINE 20 MG: 10 INJECTION, SOLUTION INTRAVENOUS at 20:38

## 2021-08-08 RX ADMIN — PROMETHAZINE HYDROCHLORIDE 12.5 MG: 25 INJECTION INTRAMUSCULAR; INTRAVENOUS at 23:25

## 2021-08-08 RX ADMIN — ONDANSETRON 4 MG: 4 TABLET, ORALLY DISINTEGRATING ORAL at 10:44

## 2021-08-08 RX ADMIN — ONDANSETRON 4 MG: 4 TABLET, ORALLY DISINTEGRATING ORAL at 14:37

## 2021-08-08 RX ADMIN — Medication 10 ML: at 14:45

## 2021-08-08 RX ADMIN — METOCLOPRAMIDE HYDROCHLORIDE 5 MG: 5 INJECTION INTRAMUSCULAR; INTRAVENOUS at 01:06

## 2021-08-08 RX ADMIN — CIPROFLOXACIN 400 MG: 2 INJECTION, SOLUTION INTRAVENOUS at 12:17

## 2021-08-08 RX ADMIN — DOCUSATE SODIUM 100 MG: 100 CAPSULE ORAL at 10:44

## 2021-08-08 RX ADMIN — METOCLOPRAMIDE HYDROCHLORIDE 5 MG: 5 INJECTION INTRAMUSCULAR; INTRAVENOUS at 06:33

## 2021-08-08 RX ADMIN — Medication 10 ML: at 05:45

## 2021-08-08 NOTE — PROGRESS NOTES
Hospitalist Progress Note               Daily Progress Note: 8/8/2021      Subjective: The patient is seen for follow  up. Marcela Daniel a 31 y.o. female who presents with no significant past medical history presents with intractable nausea and vomiting. Patient continues to have nausea and vomiting. Better then yesterday. She denies any fever. Would like to advance diet    Medications reviewed  Current Facility-Administered Medications   Medication Dose Route Frequency    ciprofloxacin (CIPRO) 400 mg in D5W IVPB (premix)  400 mg IntraVENous Q12H    polyethylene glycol (MIRALAX) packet 17 g  17 g Oral DAILY    docusate sodium (COLACE) capsule 100 mg  100 mg Oral BID    sodium chloride (NS) flush 5-40 mL  5-40 mL IntraVENous Q8H    sodium chloride (NS) flush 5-40 mL  5-40 mL IntraVENous PRN    acetaminophen (TYLENOL) tablet 650 mg  650 mg Oral Q6H PRN    Or    acetaminophen (TYLENOL) suppository 650 mg  650 mg Rectal Q6H PRN    ondansetron (ZOFRAN ODT) tablet 4 mg  4 mg Oral Q8H PRN    Or    ondansetron (ZOFRAN) injection 4 mg  4 mg IntraVENous Q6H PRN    metoclopramide HCl (REGLAN) injection 5 mg  5 mg IntraVENous Q6H    0.9% sodium chloride with KCl 20 mEq/L infusion   IntraVENous CONTINUOUS    promethazine (PHENERGAN) 12.5 mg in 0.9% sodium chloride 50 mL IVPB  12.5 mg IntraVENous Q6H PRN    famotidine (PF) (PEPCID) 20 mg in 0.9% sodium chloride 10 mL injection  20 mg IntraVENous Q12H    hydrALAZINE (APRESOLINE) 20 mg/mL injection 10 mg  10 mg IntraVENous Q6H PRN    enoxaparin (LOVENOX) injection 40 mg  40 mg SubCUTAneous DAILY       Review of Systems:   A comprehensive review of systems was negative.     Objective:   Physical Exam:     Visit Vitals  /88 (BP 1 Location: Left upper arm, BP Patient Position: At rest;Supine)   Pulse 78   Temp 99.4 °F (37.4 °C)   Resp 16   Ht 5' 7\" (1.702 m)   Wt 63.5 kg (139 lb 15.9 oz)   SpO2 100%   BMI 21.93 kg/m²      O2 Device: None (Room air)    Temp (24hrs), Av.7 °F (37.1 °C), Min:98 °F (36.7 °C), Max:99.4 °F (37.4 °C)    No intake/output data recorded. No intake/output data recorded. PHYSICAL EXAM:  General: Alert and awake  Skin: Extremities and face reveal no rashes. HEENT: Sclerae anicteric. Extra-occular muscles are intact. No oral ulcers. No ENT discharge. The neck is supple. Cardiovascular: Regular rate and rhythm. No murmurs, gallops, or rubs. PMI nondisplaced. Carotids without bruits. Respiratory: Comfortable breathing with no accessory muscle use. Clear breath sounds with no wheezes, rales, or rhonchi. GI: Abdomen nondistended, soft, and mild epigastric tenderness. Normal active bowel sounds. No enlargement of the liver or spleen. No masses palpable. Rectal: Deferred   Musculoskeletal: No pitting edema of the lower legs. Extremities have good range of motion. No costovertebral tenderness. Neurological: Gross memory appears intact. Patient is alert and oriented. Power 5/5, Cranial nerves II-XII intact  Psychiatric: Mood appears appropriate with judgement intact. Lymphatic: No cervical or supraclavicular adenopathy. Data Review:       Recent Days:  Recent Labs     21  0250   WBC 6.4   HGB 13.4   HCT 40.1        Recent Labs     21  0250      K 4.2      CO2 24   *   BUN 11   CREA 0.70   CA 8.4*   MG 2.2     No results for input(s): PH, PCO2, PO2, HCO3, FIO2 in the last 72 hours. CT ABD PELV WO CONT   Final Result   No abnormalities identified on this noncontrast study.       XR ABD (KUB)   Final Result      US ABD LTD    (Results Pending)          Assessment/     Problem List:  Hospital Problems  Never Reviewed        Codes Class Noted POA    Nausea & vomiting ICD-10-CM: R11.2  ICD-9-CM: 787.01  2021 Unknown        Hypokalemia ICD-10-CM: E87.6  ICD-9-CM: 276.8  2021 Unknown        Intractable nausea and vomiting ICD-10-CM: R11.2  ICD-9-CM: 536.2  2021 Unknown Plan:  Continue patient on IV hydration  Will advance diet to low fibre diet today.    Phenergan and Zofran alternate  GI evaluation  Continue patient on Reglan  Will get CT abd  Started empirically on cipro  Further plan of management depend upon patient's clinical course in the hospital    Safety:  Code Status: Full Code   DVT prophylaxis: Lovenox  Stress Ulcer prophylaxis: Pepcid  Bladder catheter: None  Family Contact Info:    Disposition: Home  Consults: GI    Care Plan discussed with: Patient/Family, Nurse and   Kale Hermosillo MD

## 2021-08-09 VITALS
RESPIRATION RATE: 18 BRPM | SYSTOLIC BLOOD PRESSURE: 132 MMHG | DIASTOLIC BLOOD PRESSURE: 90 MMHG | WEIGHT: 139.99 LBS | TEMPERATURE: 98.2 F | BODY MASS INDEX: 21.97 KG/M2 | HEIGHT: 67 IN | OXYGEN SATURATION: 100 % | HEART RATE: 74 BPM

## 2021-08-09 PROCEDURE — 74011250636 HC RX REV CODE- 250/636: Performed by: HOSPITALIST

## 2021-08-09 PROCEDURE — 74011250637 HC RX REV CODE- 250/637: Performed by: INTERNAL MEDICINE

## 2021-08-09 RX ORDER — CIPROFLOXACIN 500 MG/1
500 TABLET ORAL 2 TIMES DAILY
Qty: 10 TABLET | Refills: 0 | Status: SHIPPED | OUTPATIENT
Start: 2021-08-09 | End: 2021-08-14

## 2021-08-09 RX ORDER — ONDANSETRON 4 MG/1
4 TABLET, ORALLY DISINTEGRATING ORAL
Qty: 20 TABLET | Refills: 0 | Status: SHIPPED | OUTPATIENT
Start: 2021-08-09

## 2021-08-09 RX ADMIN — CIPROFLOXACIN 400 MG: 2 INJECTION, SOLUTION INTRAVENOUS at 00:17

## 2021-08-09 RX ADMIN — ONDANSETRON 4 MG: 4 TABLET, ORALLY DISINTEGRATING ORAL at 08:13

## 2021-08-09 NOTE — DISCHARGE INSTRUCTIONS
Patient Education        Nausea and Vomiting: Care Instructions  Your Care Instructions     When you are nauseated, you may feel weak and sweaty and notice a lot of saliva in your mouth. Nausea often leads to vomiting. Most of the time you do not need to worry about nausea and vomiting, but they can be signs of other illnesses. Two common causes of nausea and vomiting are stomach flu and food poisoning. Nausea and vomiting from viral stomach flu will usually start to improve within 24 hours. Nausea and vomiting from food poisoning may last from 12 to 48 hours. The doctor has checked you carefully, but problems can develop later. If you notice any problems or new symptoms, get medical treatment right away. Follow-up care is a key part of your treatment and safety. Be sure to make and go to all appointments, and call your doctor if you are having problems. It's also a good idea to know your test results and keep a list of the medicines you take. How can you care for yourself at home? · To prevent dehydration, drink plenty of fluids. Choose water and other caffeine-free clear liquids until you feel better. If you have kidney, heart, or liver disease and have to limit fluids, talk with your doctor before you increase the amount of fluids you drink. · Rest in bed until you feel better. · When you are able to eat, try clear soups, mild foods, and liquids until all symptoms are gone for 12 to 48 hours. Other good choices include dry toast, crackers, cooked cereal, and gelatin dessert, such as Jell-O. When should you call for help? Call 911 anytime you think you may need emergency care. For example, call if:    · You passed out (lost consciousness). Call your doctor now or seek immediate medical care if:    · You have symptoms of dehydration, such as:  ? Dry eyes and a dry mouth. ? Passing only a little urine. ?  Feeling thirstier than usual.     · You have new or worsening belly pain.     · You have a new or higher fever.     · You vomit blood or what looks like coffee grounds. Watch closely for changes in your health, and be sure to contact your doctor if:    · You have ongoing nausea and vomiting.     · Your vomiting is getting worse.     · Your vomiting lasts longer than 2 days.     · You are not getting better as expected. Where can you learn more? Go to http://www.hayward.com/  Enter H591 in the search box to learn more about \"Nausea and Vomiting: Care Instructions. \"  Current as of: February 26, 2020               Content Version: 12.8  © 2006-2021 [email or web address]. Care instructions adapted under license by [email or web address] (which disclaims liability or warranty for this information). If you have questions about a medical condition or this instruction, always ask your healthcare professional. [address] any warranty or liability for your use of this information.

## 2021-08-09 NOTE — PROGRESS NOTES
Problem: Falls - Risk of  Goal: *Absence of Falls  Description: Document Star Larios Fall Risk and appropriate interventions in the flowsheet.   Note: Fall Risk Interventions:  Mobility Interventions: Bed/chair exit alarm         Medication Interventions: Patient to call before getting OOB

## 2021-08-09 NOTE — PROGRESS NOTES
Progress Note    Patient: Christ Jaimes MRN: [id number]  SSN: [id number]    YOB: 1995  Age: 31 y.o. Sex: female      Admit Date: 8/5/2021    LOS: 1 day     Subjective:     Patient still has significant abdominal pain with nausea,    No document of GI bleeding, no fever,    CT unremarkable  History reviewed. No pertinent past medical history.      Current Facility-Administered Medications:     ciprofloxacin (CIPRO) 400 mg in D5W IVPB (premix), 400 mg, IntraVENous, Q12H, Patience Rojas MD, Last Rate: 200 mL/hr at 08/08/21 1217, 400 mg at 08/08/21 1217    polyethylene glycol (MIRALAX) packet 17 g, 17 g, Oral, DAILY, Patience Rojas MD    docusate sodium (COLACE) capsule 100 mg, 100 mg, Oral, BID, Patience Rojas MD, 100 mg at 08/08/21 1044    sodium chloride (NS) flush 5-40 mL, 5-40 mL, IntraVENous, Q8H, Keenan Rice MD, 10 mL at 08/08/21 1445    sodium chloride (NS) flush 5-40 mL, 5-40 mL, IntraVENous, PRN, Lili North MD    acetaminophen (TYLENOL) tablet 650 mg, 650 mg, Oral, Q6H PRN, 650 mg at 08/07/21 1702 **OR** acetaminophen (TYLENOL) suppository 650 mg, 650 mg, Rectal, Q6H PRN, Lili North MD    ondansetron (ZOFRAN ODT) tablet 4 mg, 4 mg, Oral, Q8H PRN, 4 mg at 08/08/21 2038 **OR** ondansetron (ZOFRAN) injection 4 mg, 4 mg, IntraVENous, Q6H PRN, Rossi RICE MD, 4 mg at 08/08/21 0418    metoclopramide HCl (REGLAN) injection 5 mg, 5 mg, IntraVENous, Q6H, Keenan Rice MD, 5 mg at 08/08/21 4198    0.9% sodium chloride with KCl 20 mEq/L infusion, , IntraVENous, CONTINUOUS, Rossi RICE MD, Last Rate: 125 mL/hr at 08/06/21 1558, New Bag at 08/06/21 1558    promethazine (PHENERGAN) 12.5 mg in 0.9% sodium chloride 50 mL IVPB, 12.5 mg, IntraVENous, Q6H PRN, Rossi RICE MD, Last Rate: 200 mL/hr at 08/08/21 1045, 12.5 mg at 08/08/21 1045    famotidine (PF) (PEPCID) 20 mg in 0.9% sodium chloride 10 mL injection, 20 mg, IntraVENous, Q12H, Ingris Pompa MD, 20 mg at 08/08/21 2038    hydrALAZINE (APRESOLINE) 20 mg/mL injection 10 mg, 10 mg, IntraVENous, Q6H PRN, Ingris Pompa MD    enoxaparin (LOVENOX) injection 40 mg, 40 mg, SubCUTAneous, DAILY, Keenan Auguste MD    Objective:     Vitals:    08/07/21 2042 08/08/21 0800 08/08/21 1957 08/08/21 2053   BP: 133/88 (!) 135/90 (!) 142/95    Pulse: 78 88 72    Resp: 16 15 18    Temp: 99.4 °F (37.4 °C) 98.7 °F (37.1 °C) 99 °F (37.2 °C)    SpO2: 100% 100% 100% 100%   Weight:       Height:            Intake and Output:  Current Shift: No intake/output data recorded. Last three shifts: No intake/output data recorded. Physical Exam:   Physical Exam  Constitutional:       Appearance: Normal appearance. HENT:      Mouth/Throat:      Mouth: Mucous membranes are dry. Eyes:      Extraocular Movements: Extraocular movements intact. Pupils: Pupils are equal, round, and reactive to light. Cardiovascular:      Rate and Rhythm: Normal rate. Heart sounds: Normal heart sounds. Pulmonary:      Breath sounds: Normal breath sounds. Abdominal:      General: Abdomen is flat. Bowel sounds are normal.      Palpations: There is no mass. Tenderness: There is abdominal tenderness. There is no left CVA tenderness. Hernia: No hernia is present. Musculoskeletal:         General: No deformity. Neurological:      General: No focal deficit present. Lab/Data Review:  No results found for this or any previous visit (from the past 24 hour(s)). CT ABD PELV WO CONT   Final Result   No abnormalities identified on this noncontrast study.       XR ABD (KUB)   Final Result      US ABD LTD    (Results Pending)        Assessment:     Active Problems:    Hypokalemia (8/5/2021)      Intractable nausea and vomiting (8/5/2021)      Nausea & vomiting (8/7/2021)          with intractable nauseaand vomiting       hypokalemia replaced,  pigastric tenderness   underlying gastritis  Rule out gastric ulcers  Plan:    Iv hydration  -Phenergan and ondansetron  --Clear liquid diet and will advance as tolerated                 May do an egd if symptoms persistent    Plan:       Signed By: Bhakti Bloom MD     August 8, 2021        Thank you for allowing me to participate in this patients care  Cc Referring Physician   None

## 2021-08-09 NOTE — PROGRESS NOTES
Progress Note    Patient: Chemo Salas MRN: [id number]  SSN: [id number]    YOB: 1995  Age: 31 y.o. Sex: female      Admit Date: 8/5/2021    LOS: 2 days     Subjective:     Patient has no abdominal pain or  nausea,    No document of GI bleeding, no fever,  Patient able to eat breakfast,  CT unremarkable  History reviewed. No pertinent past medical history. No current facility-administered medications for this encounter. Current Outpatient Medications:     ondansetron (ZOFRAN ODT) 4 mg disintegrating tablet, Take 1 Tablet by mouth every eight (8) hours as needed for Nausea or Vomiting., Disp: 20 Tablet, Rfl: 0    ciprofloxacin HCl (Cipro) 500 mg tablet, Take 1 Tablet by mouth two (2) times a day for 5 days. , Disp: 10 Tablet, Rfl: 0    Objective:     Vitals:    08/08/21 1957 08/08/21 2053 08/09/21 0001 08/09/21 0721   BP: (!) 142/95  (!) 131/90 (!) 132/90   Pulse: 72  73 74   Resp: 18  18 18   Temp: 99 °F (37.2 °C)  99 °F (37.2 °C) 98.2 °F (36.8 °C)   SpO2: 100% 100% 98% 100%   Weight:       Height:            Intake and Output:  Current Shift: No intake/output data recorded. Last three shifts: No intake/output data recorded. Physical Exam:   Physical Exam  Constitutional:       Appearance: Normal appearance. HENT:      Mouth/Throat:      Mouth: Mucous membranes are dry. Eyes:      Extraocular Movements: Extraocular movements intact. Pupils: Pupils are equal, round, and reactive to light. Cardiovascular:      Rate and Rhythm: Normal rate. Heart sounds: Normal heart sounds. Pulmonary:      Breath sounds: Normal breath sounds. Abdominal:      General: Abdomen is flat. Bowel sounds are normal.      Palpations: There is no mass. Tenderness: There is no abdominal tenderness. There is no left CVA tenderness. Hernia: No hernia is present. Musculoskeletal:         General: No deformity. Neurological:      General: No focal deficit present.           Bryan Candelario Review:  No results found for this or any previous visit (from the past 24 hour(s)). CT ABD PELV WO CONT   Final Result   No abnormalities identified on this noncontrast study.       XR ABD (KUB)   Final Result           Assessment:     Active Problems:    Hypokalemia (8/5/2021)      Intractable nausea and vomiting (8/5/2021)      Nausea & vomiting (8/7/2021)          with intractable nauseaand vomiting       hypokalemia replaced,  pigastric tenderness   underlying gastritis    Plan:            He has been discharged by the hospitalist, since patient doing much better, no signs of GI bleeding, obstruction, perforation,  Outpatient follow-up as needed,  Continue PPI, symptomatic treatment for nausea if patient symptoms    Plan:       Signed By: Stefanie Fang MD     August 9, 2021        Thank you for allowing me to participate in this patients care  Cc Referring Physician   None

## 2021-08-09 NOTE — PROGRESS NOTES
Vitals stable, IV removed, tip intact. Discharge paperwork provided and verbalized understanding. Escorted downstairs and left in private vehicle.

## 2021-08-09 NOTE — DISCHARGE SUMMARY
HOSPITALIST DISCHARGE SUMMARY    NAME: Moises Carroll   :  1995   MRN:  [id number]     Date/Time:  2021 8:31 AM    DISCHARGE DIAGNOSIS:  Active Problems:    Hypokalemia (2021)      Intractable nausea and vomiting (2021)      Nausea & vomiting (2021)        Admission Diagnosis:  Intractable nausea and vomiting    CONSULTATIONS: GI    Procedures: see electronic medical records for all procedures/Xrays and details which were not copied into this note but were reviewed prior to creation of Plan. Please follow-up tests/labs that are still pendin. Follow up with GI to determine if further testing is needed    DISCHARGE SUMMARY/HOSPITAL COURSE: for full details see H&P, daily progress notes, labs, consult notes. Briefly As Per HPI:  79-year-old female with a no significant past medical history was admitted to the hospital with a complaint of intractable nausea and vomiting. Patient was slightly hypokalemic on admission. Patient's potassium improved after that. Patient's diet was slowly advanced. Patient states that she was able to tolerate regular diet last night. She feels her nausea vomiting is much better now and sublingual Zofran also helps significantly. Patient will be discharged with as needed Zofran. Patient wants to go home. She is advised to follow-up with GI on discharge.  _______________________________________________________________________   Patient seen and examined by me on day of discharge.   Pertinent findings are:  Vitals:  Visit Vitals  BP (!) 132/90 (BP 1 Location: Left upper arm, BP Patient Position: At rest)   Pulse 74   Temp 98.2 °F (36.8 °C)   Resp 18   Ht 5' 7\" (1.702 m)   Wt 63.5 kg (139 lb 15.9 oz)   SpO2 100%   BMI 21.93 kg/m²     Temp (24hrs), Av.7 °F (37.1 °C), Min:98.2 °F (36.8 °C), Max:99 °F (37.2 °C)      Last 24hr Input/Output:  No intake or output data in the 24 hours ending 21 0831     PHYSICAL EXAM:   General: Alert and awake  Skin: Extremities and face reveal no rashes. No cruz erythema. No telangiectasias on the chest wall. HEENT: Sclerae anicteric. Extra-occular muscles are intact. No oral ulcers. No ENT discharge. The neck is supple. Cardiovascular: Regular rate and rhythm. No murmurs, gallops, or rubs. PMI nondisplaced. Carotids without bruits. Respiratory: Comfortable breathing with no accessory muscle use. Clear breath sounds with no wheezes, rales, or rhonchi. GI: Abdomen nondistended, soft, and nontender. Normal active bowel sounds. No enlargement of the liver or spleen. No masses palpable. Rectal: Deferred   Musculoskeletal: No pitting edema of the lower legs. Extremities have good range of motion. No costovertebral tenderness. Neurological: Gross memory appears intact. Patient is alert and oriented. Power 5/5  Psychiatric: Mood appears appropriate with judgement intact. Lymphatic: No cervical or supraclavicular adenopathy. See Discharge Instructions for further details. _______________________________________________________________________  DISPOSITION:    Home with Family: x   Home with HH/PT/OT/RN:    SNF/LTC:    ELIAN:    OTHER:    ________________________________________________________________________  Medications Reviewed:  Current Discharge Medication List      START taking these medications    Details   ondansetron (ZOFRAN ODT) 4 mg disintegrating tablet Take 1 Tablet by mouth every eight (8) hours as needed for Nausea or Vomiting. Qty: 20 Tablet, Refills: 0  Start date: 8/9/2021      ciprofloxacin HCl (Cipro) 500 mg tablet Take 1 Tablet by mouth two (2) times a day for 5 days.   Qty: 10 Tablet, Refills: 0  Start date: 8/9/2021, End date: 8/14/2021             [facility]/ reviewed - no change compared to H&P  ________________________________________________________________________    Recommended diet:  DIET ADULT    Recommended activity:Activity as tolerated       Code status: Full      Follow Up:  Follow-up Information     Follow up With Specialties Details Why Contact Ashley Burgess MD Gastroenterology, Internal Medicine Schedule an appointment as soon as possible for a visit in 1 week  [address]  [phone]      None    None ([phone]) Patient stated that they have no PCP             ______________________________________________________________________  Total time spent in discharge (min): >35 min   ________________________________________________________________________    Care Plan discussed with:    Comments   Patient x    Family      RN x    Care Manager x                   Consultant:  x gastroentrology   ________________________________________________________________________  Attending Physician: Jarek Fang MD  ________________________________________________________________________  **Lab Data Reviewed:  No results found for this or any previous visit (from the past 24 hour(s)). CT ABD PELV WO CONT   Final Result   No abnormalities identified on this noncontrast study.       XR ABD ([facility])   Final Result

## 2022-03-19 PROBLEM — R11.2 NAUSEA & VOMITING: Status: ACTIVE | Noted: 2021-08-07

## 2022-03-19 PROBLEM — E87.6 HYPOKALEMIA: Status: ACTIVE | Noted: 2021-08-05

## 2022-03-19 PROBLEM — R11.2 INTRACTABLE NAUSEA AND VOMITING: Status: ACTIVE | Noted: 2021-08-05

## 2023-05-15 RX ORDER — ONDANSETRON 4 MG/1
4 TABLET, ORALLY DISINTEGRATING ORAL EVERY 8 HOURS PRN
COMMUNITY
Start: 2021-08-09

## 2024-01-01 NOTE — PROGRESS NOTES
Progress Note    Patient: Vignesh Lyons MRN: [id number]  SSN: [id number]    YOB: 1995  Age: 31 y.o. Sex: female      Admit Date: 8/5/2021    LOS: 0 days     Subjective:     Patient still has significant abdominal pain with nausea, had multiple dosage of pain medication with antiemetic medications,  Start on antibiotic this morning by her attending physician,  No document of GI bleeding, no fever,  History reviewed. No pertinent past medical history.      Current Facility-Administered Medications:     ciprofloxacin (CIPRO) 400 mg in D5W IVPB (premix), 400 mg, IntraVENous, Q12H, Marely Streeter MD, Last Rate: 200 mL/hr at 08/07/21 1317, 400 mg at 08/07/21 1317    [START ON 8/8/2021] polyethylene glycol (MIRALAX) packet 17 g, 17 g, Oral, DAILY, Marely Streeter MD    ketorolac (TORADOL) injection 30 mg, 30 mg, IntraVENous, Q6H PRN, Marely Streeter MD, 30 mg at 08/07/21 1317    sodium chloride (NS) flush 5-40 mL, 5-40 mL, IntraVENous, Q8H, Keenan Auguste MD, 10 mL at 08/07/21 0559    sodium chloride (NS) flush 5-40 mL, 5-40 mL, IntraVENous, PRN, Ingris Pompa MD    acetaminophen (TYLENOL) tablet 650 mg, 650 mg, Oral, Q6H PRN, 650 mg at 08/05/21 2151 **OR** acetaminophen (TYLENOL) suppository 650 mg, 650 mg, Rectal, Q6H PRN, Keenan Auguste MD    ondansetron (ZOFRAN ODT) tablet 4 mg, 4 mg, Oral, Q8H PRN **OR** ondansetron (ZOFRAN) injection 4 mg, 4 mg, IntraVENous, Q6H PRN, Keenan Auguste MD, 4 mg at 08/07/21 0042    metoclopramide HCl (REGLAN) injection 5 mg, 5 mg, IntraVENous, Q6H, Keenan Auguste MD, 5 mg at 08/07/21 1317    0.9% sodium chloride with KCl 20 mEq/L infusion, , IntraVENous, CONTINUOUS, Rj RICE MD, Last Rate: 125 mL/hr at 08/06/21 1558, New Bag at 08/06/21 1558    promethazine (PHENERGAN) 12.5 mg in 0.9% sodium chloride 50 mL IVPB, 12.5 mg, IntraVENous, Q6H PRN, Rj RICE MD, Last Rate: 200 mL/hr at 08/07/21 0650, 12.5 mg at 08/07/21 2016    famotidine (PF) (PEPCID) 20 mg in 0.9% sodium chloride 10 mL injection, 20 mg, IntraVENous, Q12H, Keenan Fisher MD, 20 mg at 08/07/21 0949    hydrALAZINE (APRESOLINE) 20 mg/mL injection 10 mg, 10 mg, IntraVENous, Q6H PRN, Keenan Fisher MD    enoxaparin (LOVENOX) injection 40 mg, 40 mg, SubCUTAneous, DAILY, Keenan Fisher MD    Objective:     Vitals:    08/06/21 1455 08/06/21 2033 08/07/21 0036 08/07/21 0902   BP: (!) 146/93 120/76 132/89 (!) 155/88   Pulse: 71 61 63 69   Resp: 18 17 18 18   Temp: 98 °F (36.7 °C) 98.1 °F (36.7 °C) 98.4 °F (36.9 °C) 99 °F (37.2 °C)   SpO2: 100% 98% 100% 100%   Weight:       Height:            Intake and Output:  Current Shift: No intake/output data recorded. Last three shifts: 08/05 1901 - 08/07 0700  In: 400 [P.O.:400]  Out: -     Physical Exam:   Physical Exam  Constitutional:       Appearance: Normal appearance. HENT:      Mouth/Throat:      Mouth: Mucous membranes are dry. Eyes:      Extraocular Movements: Extraocular movements intact. Pupils: Pupils are equal, round, and reactive to light. Cardiovascular:      Rate and Rhythm: Normal rate. Heart sounds: Normal heart sounds. Pulmonary:      Breath sounds: Normal breath sounds. Abdominal:      General: Abdomen is flat. Bowel sounds are normal.      Palpations: There is no mass. Tenderness: There is abdominal tenderness. There is no left CVA tenderness. Hernia: No hernia is present. Musculoskeletal:         General: No deformity. Neurological:      General: No focal deficit present. Lab/Data Review:  No results found for this or any previous visit (from the past 24 hour(s)).      XR ABD (KUB)   Final Result      US ABD LTD    (Results Pending)   CT ABD PELV WO CONT    (Results Pending)        Assessment:     Active Problems:    Hypokalemia (8/5/2021)      Intractable nausea and vomiting (8/5/2021)      Nausea & vomiting (8/7/2021)          with intractable nauseaand vomiting       hypokalemia replaced,  pigastric tenderness   underlying gastritis  Rule out gastric ulcers  Plan:    Iv hydration  -Phenergan and ondansetron  --Clear liquid diet and will advance as tolerated           Abdominal ultrasound , abdominal CT  Ordered, will follow the result,    She mentioned that she was seen in [facility] emergency room before,where she may had a CT, I reviewed [facility] website, no document in the if [facility]-2 medical recordsthat indicated patient may had the ER visiting    Plan:       Signed By: Kimberlee Garcia MD     August 7, 2021        Thank you for allowing me to participate in this patients care  Cc Referring Physician   None [phone]